# Patient Record
Sex: MALE | Race: WHITE | NOT HISPANIC OR LATINO | Employment: FULL TIME | URBAN - METROPOLITAN AREA
[De-identification: names, ages, dates, MRNs, and addresses within clinical notes are randomized per-mention and may not be internally consistent; named-entity substitution may affect disease eponyms.]

---

## 2023-09-29 ENCOUNTER — HOSPITAL ENCOUNTER (OUTPATIENT)
Dept: DATA CONVERSION | Facility: HOSPITAL | Age: 53
Discharge: HOME | End: 2023-09-29
Payer: COMMERCIAL

## 2023-09-29 DIAGNOSIS — N43.41 SPERMATOCELE OF EPIDIDYMIS, SINGLE: ICD-10-CM

## 2023-10-13 ENCOUNTER — APPOINTMENT (OUTPATIENT)
Dept: RADIOLOGY | Facility: HOSPITAL | Age: 53
End: 2023-10-13
Payer: COMMERCIAL

## 2023-10-13 ENCOUNTER — APPOINTMENT (OUTPATIENT)
Dept: RADIOLOGY | Facility: CLINIC | Age: 53
End: 2023-10-13
Payer: COMMERCIAL

## 2023-10-13 LAB
ANION GAP SERPL CALC-SCNC: 10 MMOL/L
APPEARANCE UR: CLEAR
BASOPHILS # BLD AUTO: 0.09 X10*3/UL (ref 0–0.1)
BASOPHILS NFR BLD AUTO: 0.8 %
BILIRUB UR STRIP.AUTO-MCNC: NEGATIVE MG/DL
BUN SERPL-MCNC: 17 MG/DL (ref 8–25)
CALCIUM SERPL-MCNC: 9 MG/DL (ref 8.5–10.4)
CHLORIDE SERPL-SCNC: 106 MMOL/L (ref 97–107)
CO2 SERPL-SCNC: 24 MMOL/L (ref 24–31)
COLOR UR: COLORLESS
CREAT SERPL-MCNC: 1 MG/DL (ref 0.4–1.6)
EOSINOPHIL # BLD AUTO: 0.14 X10*3/UL (ref 0–0.7)
EOSINOPHIL NFR BLD AUTO: 1.2 %
ERYTHROCYTE [DISTWIDTH] IN BLOOD BY AUTOMATED COUNT: 13 % (ref 11.5–14.5)
GFR SERPL CREATININE-BSD FRML MDRD: >90 ML/MIN/1.73M*2
GLUCOSE SERPL-MCNC: 112 MG/DL (ref 65–99)
GLUCOSE UR STRIP.AUTO-MCNC: NORMAL MG/DL
HCT VFR BLD AUTO: 39.5 % (ref 41–52)
HGB BLD-MCNC: 13.6 G/DL (ref 13.5–17.5)
IMM GRANULOCYTES # BLD AUTO: 0.03 X10*3/UL (ref 0–0.7)
IMM GRANULOCYTES NFR BLD AUTO: 0.3 % (ref 0–0.9)
KETONES UR STRIP.AUTO-MCNC: NEGATIVE MG/DL
LEUKOCYTE ESTERASE UR QL STRIP.AUTO: NEGATIVE
LYMPHOCYTES # BLD AUTO: 3.69 X10*3/UL (ref 1.2–4.8)
LYMPHOCYTES NFR BLD AUTO: 32.7 %
MCH RBC QN AUTO: 31.3 PG (ref 26–34)
MCHC RBC AUTO-ENTMCNC: 34.4 G/DL (ref 32–36)
MCV RBC AUTO: 91 FL (ref 80–100)
MONOCYTES # BLD AUTO: 0.9 X10*3/UL (ref 0.1–1)
MONOCYTES NFR BLD AUTO: 8 %
NEUTROPHILS # BLD AUTO: 6.43 X10*3/UL (ref 1.2–7.7)
NEUTROPHILS NFR BLD AUTO: 57 %
NITRITE UR QL STRIP.AUTO: NEGATIVE
NRBC BLD-RTO: 0 /100 WBCS (ref 0–0)
PH UR STRIP.AUTO: 6 [PH]
PLATELET # BLD AUTO: 282 X10*3/UL (ref 150–450)
PMV BLD AUTO: 10.5 FL (ref 7.5–11.5)
POTASSIUM SERPL-SCNC: 3.5 MMOL/L (ref 3.4–5.1)
PROT UR STRIP.AUTO-MCNC: NEGATIVE MG/DL
RBC # BLD AUTO: 4.35 X10*6/UL (ref 4.5–5.9)
RBC # UR STRIP.AUTO: NEGATIVE /UL
SODIUM SERPL-SCNC: 140 MMOL/L (ref 133–145)
SP GR UR STRIP.AUTO: 1
UROBILINOGEN UR STRIP.AUTO-MCNC: NORMAL MG/DL
WBC # BLD AUTO: 11.3 X10*3/UL (ref 4.4–11.3)

## 2023-10-13 PROCEDURE — 36415 COLL VENOUS BLD VENIPUNCTURE: CPT | Performed by: EMERGENCY MEDICINE

## 2023-10-13 PROCEDURE — 76870 US EXAM SCROTUM: CPT

## 2023-10-13 PROCEDURE — 81003 URINALYSIS AUTO W/O SCOPE: CPT | Performed by: EMERGENCY MEDICINE

## 2023-10-13 PROCEDURE — 85025 COMPLETE CBC W/AUTO DIFF WBC: CPT | Performed by: EMERGENCY MEDICINE

## 2023-10-13 PROCEDURE — 99284 EMERGENCY DEPT VISIT MOD MDM: CPT | Mod: 25

## 2023-10-13 PROCEDURE — 80048 BASIC METABOLIC PNL TOTAL CA: CPT | Performed by: EMERGENCY MEDICINE

## 2023-10-13 ASSESSMENT — PAIN DESCRIPTION - ORIENTATION: ORIENTATION: RIGHT

## 2023-10-13 ASSESSMENT — PAIN DESCRIPTION - LOCATION: LOCATION: SCROTUM

## 2023-10-13 ASSESSMENT — COLUMBIA-SUICIDE SEVERITY RATING SCALE - C-SSRS
6. HAVE YOU EVER DONE ANYTHING, STARTED TO DO ANYTHING, OR PREPARED TO DO ANYTHING TO END YOUR LIFE?: NO
2. HAVE YOU ACTUALLY HAD ANY THOUGHTS OF KILLING YOURSELF?: NO
1. IN THE PAST MONTH, HAVE YOU WISHED YOU WERE DEAD OR WISHED YOU COULD GO TO SLEEP AND NOT WAKE UP?: NO

## 2023-10-13 ASSESSMENT — PAIN - FUNCTIONAL ASSESSMENT: PAIN_FUNCTIONAL_ASSESSMENT: 0-10

## 2023-10-13 ASSESSMENT — PAIN SCALES - GENERAL: PAINLEVEL_OUTOF10: 2

## 2023-10-14 ENCOUNTER — HOSPITAL ENCOUNTER (EMERGENCY)
Facility: HOSPITAL | Age: 53
Discharge: HOME | End: 2023-10-14
Attending: EMERGENCY MEDICINE
Payer: COMMERCIAL

## 2023-10-14 VITALS
BODY MASS INDEX: 30.1 KG/M2 | DIASTOLIC BLOOD PRESSURE: 88 MMHG | RESPIRATION RATE: 14 BRPM | WEIGHT: 215 LBS | TEMPERATURE: 97.6 F | OXYGEN SATURATION: 99 % | SYSTOLIC BLOOD PRESSURE: 159 MMHG | HEIGHT: 71 IN | HEART RATE: 81 BPM

## 2023-10-14 DIAGNOSIS — N50.82 SCROTAL PAIN: Primary | ICD-10-CM

## 2023-10-14 DIAGNOSIS — T81.89XA DRAINING POSTOPERATIVE WOUND, INITIAL ENCOUNTER: ICD-10-CM

## 2023-10-14 ASSESSMENT — LIFESTYLE VARIABLES
REASON UNABLE TO ASSESS: NO
EVER FELT BAD OR GUILTY ABOUT YOUR DRINKING: NO
EVER HAD A DRINK FIRST THING IN THE MORNING TO STEADY YOUR NERVES TO GET RID OF A HANGOVER: NO
HAVE PEOPLE ANNOYED YOU BY CRITICIZING YOUR DRINKING: NO
HAVE YOU EVER FELT YOU SHOULD CUT DOWN ON YOUR DRINKING: NO

## 2023-10-14 NOTE — ED PROVIDER NOTES
"TIME: 2:46 AM   PCP: No Assigned PCP Generic Provider, MD    HISTORY  CHIEF COMPLAINT entered by TRIAGE:  Triage note reviewed and states: Groin Swelling (Patient had surgery on his right testicle 2 weeks ago after a crowbar incident at work.  Today he was driving and felt his pants and legs wet suddenly and noticed a lot of clear bloody drainage; states \"a lot of it\".  Patient called his doctor and they called in antibiotic.  Patient was concerned because he has had the chills today.  Felt better after it drained but now its sore.)    HISTORY:  Historian is: patient. History/Exam Limitations: none.  Jordan Singh is a 53 y.o. male who comes from home with a complaint of groin pain    53 YOM who presents to the ED with swelling and draining R testicular wound. Patient has a history of multiple testicular surgeries related to prior trauma to R testicle. States that two weeks ago, a urologist completed a surgery on his R testicular \"cord.\" Since then, he was healing well until about a week ago. Developed a large fluid filled mass over the R testicle. He says it was the size of a softball. His urologist called in KEFLEX and he started taking it today. This evening, the patient was sitting in his car when his large fluid filled mass \"popped.\" He says a large amount of blood tinged fluid drained from his testicular wound all over his car. He says since then, he continues to have scant clear drainage and \"pus\" drain from the testicle. He has minimal pain. No fevers. No chills. No urinary complaints. No new trauma. No other complaints.     Nursing notes reviewed. Outside records reviewed: Unable to see uro notes.    ROS:  Constitutional - No fever, no chills  HEENT - No visual changes, no eye pain, no ear pain, no sore throat  Head: Atraumatic, normocephalic  Respiratory - No cough, no shortness of breath  Cardiovascular - No dyspnea on exertion, no chest pain, no LE edema, no palpitations   Gastrointestinal - No nausea or " "vomiting, no abd pain, no diarrhea, no black or bloody stools  Genitourinary - No dysuria, urgency, or frequency, no hematuria; + lesions + discharge  Skin - No rash, no bruising  Musculoskeletal - No joint pain or swelling  Neurological - No weakness, no numbness, no HA, no ataxia    PAST MEDICAL HISTORY:  There is no problem list on file for this patient.   No past medical history on file.  PAST SURGICAL HISTORY:  No past surgical history on file.  MEDICATIONS:  No current facility-administered medications on file prior to encounter.     No current outpatient medications on file prior to encounter.     Medications ordered at this visit have been reconciled with the above list of medications.    ALLERGIES:  Allergies   Allergen Reactions    Codeine Itching     SOCIAL HISTORY:  Social History     Tobacco Use   Smoking Status Not on file   Smokeless Tobacco Not on file      Social History     Substance and Sexual Activity   Alcohol Use Not on file      Social History     Substance and Sexual Activity   Drug Use Not on file     FAMILY HISTORY:  No family history on file.    PHYSICAL EXAM  Triage vitals: Visit Vitals  BP (!) 169/99 (BP Location: Left arm, Patient Position: Sitting)   Pulse 64   Temp 36.6 °C (97.9 °F) (Oral)   Resp 16   Ht 1.803 m (5' 11\")   Wt 97.5 kg (215 lb)   SpO2 98%   BMI 29.99 kg/m²   BSA 2.21 m²     Vital signs, oxygen saturation have been reviewed and interpreted as: HTN    General - Alert, no acute distress, nontoxic, oriented x 4  Head - Normocephalic and atraumatic, no gross abnormalities  Eyes - Eyes normal  Ears - Ear external normal  Nose - No congestion or discharge  Throat - Clear, mmm  Neck - Supple  Lungs - CTAB, normal resp effort  Heart - RRR, no murmur  Abdomen - Soft, no tenderness, no rebound, no rigidity  : R lateral scrotum with erythematous indurated skin, approx 2x2cm in size, no obvious drainage, no fluctuance. Minimally tender.   Extremity - No focal tenderness, normal " ROM, no trauma  Back - Normal, no CVAT  Skin - Warm, dry, no rash  Neuro - CN 2-12 intact, moves all extremities spontaneously bilaterally   Psych - normal mood and affect, normal judgment    ED COURSE/MDM    MDM:  53 YOM who presents with draining testicular mass 2 weeks post op. The area itself does not appear to be an abscess, could have an overlying cellulitis but based on the sound of history, likely had a seroma that has since drained. Will get US to ensure there is no complex cyst in testicle concerning for abscess. Not septic appearing. No urinary complaints concerning for uti. Will monitor in the ED.     ED COURSE:    2:46 AM  Discussed with Dr. Marks. US here shows some r scrotal soft tissue swelling with trace fluid. Shared these results, color of fluid and lab work with . We agree that the patient likely has a seroma and the fluid should improve over the weekend. Recommended keeping the area clean and dry with gauze, continuing meds and calling Dr. Abbott on Monday. Recommended returning with worsening pain, redness or foul smelling drainage.     ED PROVIDER INTERPRETATION OF DATA (RAD, EKG):    ABNORMAL LABS SUMMARY:  Labs Reviewed   CBC WITH AUTO DIFFERENTIAL - Abnormal       Result Value    WBC 11.3      nRBC 0.0      RBC 4.35 (*)     Hemoglobin 13.6      Hematocrit 39.5 (*)     MCV 91      MCH 31.3      MCHC 34.4      RDW 13.0      Platelets 282      MPV 10.5      Neutrophils % 57.0      Immature Granulocytes %, Automated 0.3      Lymphocytes % 32.7      Monocytes % 8.0      Eosinophils % 1.2      Basophils % 0.8      Neutrophils Absolute 6.43      Immature Granulocytes Absolute, Automated 0.03      Lymphocytes Absolute 3.69      Monocytes Absolute 0.90      Eosinophils Absolute 0.14      Basophils Absolute 0.09     BASIC METABOLIC PANEL - Abnormal    Glucose 112 (*)     Sodium 140      Potassium 3.5      Chloride 106      Bicarbonate 24      Urea Nitrogen 17      Creatinine 1.00       eGFR >90      Calcium 9.0      Anion Gap 10     URINALYSIS WITH REFLEX MICROSCOPIC - Abnormal    Color, Urine Colorless (*)     Appearance, Urine Clear      Specific Gravity, Urine 1.005      pH, Urine 6.0      Protein, Urine NEGATIVE      Glucose, Urine Normal      Blood, Urine NEGATIVE      Ketones, Urine NEGATIVE      Bilirubin, Urine NEGATIVE      Urobilinogen, Urine Normal      Nitrite, Urine NEGATIVE      Leukocyte Esterase, Urine NEGATIVE       The above laboratory studies were interpreted by me and reveal UA neg, no anemia, wbc 11.     PROCEDURES/BEDSIDE ULTRASOUND:    DIAGNOSIS AND DISPOSITION  DIAGNOSIS/IMPRESSION:  1. Scrotal pain        2. Draining postoperative wound, initial encounter            DISPOSITION:  Disposition: Discharge home  Condition: Good    DISCHARGE PRESCRIPTIONS/INSTRUCTIONS:    Discharge instructions given to patient. I discussed the diagnosis, treatment plan and follow-up plan with the patient and/or family. Reasons to return to the Emergency Department were reviewed in detail. All questions were answered. The patient and/or family expressed understanding of instructions and return precautions.    Referral to PCP/specialist for further evaluation, if specified:  Dequan Marks MD  [id number] [id number]  [phone]      Please keep your wound clean and dry. Use gauze to keep the area covered. If you have worsening redness, pain or fevers over the area, please return to the ED.    DIAGNOSTIC REPORTS:  Laboratory/radiology tests have been ordered with results reviewed and considered in the medical decision making process.    Provider Attestation (if applicable) and Signature:  Jesus Bains MD  Emergency Department    Notes: Portions of this report may have been transcribed using voice recognition software. Every effort was made to ensure accuracy; however, inadvertent computerized transcription errors may be present.       Jesus Bains MD  10/14/23  [id number]

## 2024-03-05 ENCOUNTER — APPOINTMENT (OUTPATIENT)
Dept: RADIOLOGY | Facility: HOSPITAL | Age: 54
End: 2024-03-05
Payer: COMMERCIAL

## 2024-03-05 ENCOUNTER — HOSPITAL ENCOUNTER (EMERGENCY)
Facility: HOSPITAL | Age: 54
Discharge: HOME | End: 2024-03-05
Attending: STUDENT IN AN ORGANIZED HEALTH CARE EDUCATION/TRAINING PROGRAM
Payer: COMMERCIAL

## 2024-03-05 VITALS
HEART RATE: 62 BPM | RESPIRATION RATE: 18 BRPM | SYSTOLIC BLOOD PRESSURE: 169 MMHG | HEIGHT: 71 IN | OXYGEN SATURATION: 100 % | TEMPERATURE: 96.8 F | DIASTOLIC BLOOD PRESSURE: 98 MMHG | BODY MASS INDEX: 29.4 KG/M2 | WEIGHT: 210 LBS

## 2024-03-05 DIAGNOSIS — G56.02 CARPAL TUNNEL SYNDROME OF LEFT WRIST: Primary | ICD-10-CM

## 2024-03-05 DIAGNOSIS — M25.532 ACUTE PAIN OF LEFT WRIST: ICD-10-CM

## 2024-03-05 PROCEDURE — 73110 X-RAY EXAM OF WRIST: CPT | Mod: LT

## 2024-03-05 PROCEDURE — 99283 EMERGENCY DEPT VISIT LOW MDM: CPT

## 2024-03-05 PROCEDURE — 2500000004 HC RX 250 GENERAL PHARMACY W/ HCPCS (ALT 636 FOR OP/ED): Performed by: STUDENT IN AN ORGANIZED HEALTH CARE EDUCATION/TRAINING PROGRAM

## 2024-03-05 PROCEDURE — 73110 X-RAY EXAM OF WRIST: CPT | Mod: LEFT SIDE | Performed by: RADIOLOGY

## 2024-03-05 RX ORDER — PREDNISONE 20 MG/1
20 TABLET ORAL DAILY
Qty: 14 TABLET | Refills: 0 | Status: SHIPPED | OUTPATIENT
Start: 2024-03-05 | End: 2024-03-05 | Stop reason: SDUPTHER

## 2024-03-05 RX ORDER — PREDNISONE 20 MG/1
20 TABLET ORAL DAILY
Qty: 14 TABLET | Refills: 0 | Status: SHIPPED | OUTPATIENT
Start: 2024-03-05 | End: 2024-03-19

## 2024-03-05 RX ORDER — DEXAMETHASONE 6 MG/1
6 TABLET ORAL ONCE
Status: COMPLETED | OUTPATIENT
Start: 2024-03-05 | End: 2024-03-05

## 2024-03-05 RX ADMIN — DEXAMETHASONE 6 MG: 6 TABLET ORAL at 07:37

## 2024-03-05 ASSESSMENT — PAIN SCALES - GENERAL: PAINLEVEL_OUTOF10: 3

## 2024-03-05 ASSESSMENT — LIFESTYLE VARIABLES
HAVE PEOPLE ANNOYED YOU BY CRITICIZING YOUR DRINKING: NO
HAVE YOU EVER FELT YOU SHOULD CUT DOWN ON YOUR DRINKING: NO
EVER HAD A DRINK FIRST THING IN THE MORNING TO STEADY YOUR NERVES TO GET RID OF A HANGOVER: NO
EVER FELT BAD OR GUILTY ABOUT YOUR DRINKING: NO

## 2024-03-05 ASSESSMENT — PAIN - FUNCTIONAL ASSESSMENT: PAIN_FUNCTIONAL_ASSESSMENT: 0-10

## 2024-03-05 ASSESSMENT — PAIN DESCRIPTION - PROGRESSION: CLINICAL_PROGRESSION: GRADUALLY IMPROVING

## 2024-03-05 NOTE — ED PROVIDER NOTES
HPI   Chief Complaint   Patient presents with    Hand Pain     Pt states that around 3pm yesterday he felt a pop in his left hand. After that his hands felt numb , pain started shooting up his arm. He used ice and took pain meds but nothing worked       53-year-old male presents with left wrist pain.  Patient states yesterday his left wrist appeared to be stuck in a particular position and it popped, having immediate pain and swelling.  He went to bed and woke up early this morning with severe pain to the left wrist.  Notes the swelling was significantly worse.  Took approximately seven 200 mg tablets of ibuprofen in an effort to control the pain.  Notes increased numbness to his thumb, index, middle finger but sparing the pinky finger.  No prior history of carpal tunnel.  No prior history of wrist injury.  Denies recent trauma or falls.                          No data recorded                   Patient History   No past medical history on file.  Past Surgical History:   Procedure Laterality Date    TESTICLE SURGERY       No family history on file.  Social History     Tobacco Use    Smoking status: Unknown    Smokeless tobacco: Not on file   Substance Use Topics    Alcohol use: Defer    Drug use: Defer       Physical Exam   ED Triage Vitals [03/05/24 0627]   Temperature Heart Rate Respirations BP   36 °C (96.8 °F) 62 18 (!) 169/98      Pulse Ox Temp Source Heart Rate Source Patient Position   100 % Tympanic -- Sitting      BP Location FiO2 (%)     Right arm --       Physical Exam  Vitals and nursing note reviewed.   Constitutional:       General: He is not in acute distress.     Appearance: He is not ill-appearing.   HENT:      Head: Normocephalic and atraumatic.      Mouth/Throat:      Mouth: Mucous membranes are moist.      Pharynx: Oropharynx is clear.   Eyes:      Extraocular Movements: Extraocular movements intact.      Conjunctiva/sclera: Conjunctivae normal.      Pupils: Pupils are equal, round, and reactive  to light.   Cardiovascular:      Rate and Rhythm: Normal rate and regular rhythm.   Pulmonary:      Effort: Pulmonary effort is normal. No respiratory distress.      Breath sounds: Normal breath sounds.   Abdominal:      General: There is no distension.      Palpations: Abdomen is soft.      Tenderness: There is no abdominal tenderness.   Musculoskeletal:         General: No swelling or deformity. Normal range of motion.      Left wrist: Swelling present. No bony tenderness or snuff box tenderness.      Cervical back: Normal range of motion and neck supple.      Comments: Palpable radial and ulnar pulse to left upper extremity.  Swelling over carpal tunnel region.  Motor intact to hand and fingers.  Normal  strength. Reported sensation change to thumb, index, and middle fingers.    Skin:     General: Skin is warm and dry.      Capillary Refill: Capillary refill takes less than 2 seconds.   Neurological:      General: No focal deficit present.      Mental Status: He is alert and oriented to person, place, and time. Mental status is at baseline.   Psychiatric:         Mood and Affect: Mood normal.         Behavior: Behavior normal.         ED Course & MDM   ED Course as of 03/05/24 0857   Tue Mar 05, 2024   0823 XR wrist left 3+ views  FINDINGS:  There is no fracture or subluxation. Well corticated small bone  densities are noted along the volar aspect of the radiocarpal joint.  Minimal degenerative changes  are present, involving the 1st  carpometacarpal joint and articulation between the navicular and  trapezium/trapezoid, with joint space narrowing and small  osteophytes. The alignment is anatomic. The soft tissues are  unremarkable.      IMPRESSION:  Minimal degenerative changes without acute fracture or subluxation.   [JM]      ED Course User Index  [JM] Rody Florez MD         Diagnoses as of 03/05/24 0857   Carpal tunnel syndrome of left wrist   Acute pain of left wrist       Medical Decision  Making  53 y.o. male presents to the ED with left wrist pain and swelling.  Denies trauma. No deformity, no open wounds, or neurovascular compromise on exam. Most likely diagnosis from the mechanism and initial H&P is carpal tunnel syndrome. Considered fracture, muscle strain, tendinitis, contusion.  Xray negative for fracture or dislocation.  Prescribed oral steroids and placed patient in wrist brace.  Encouraged patient to use brace at night.  Provided patient with name of hand and wrist surgeon.  Patient stable for discharge at this time.         Procedure  Procedures     Rody Florez MD  03/05/24 0995

## 2024-03-11 ENCOUNTER — PHARMACY VISIT (OUTPATIENT)
Dept: PHARMACY | Facility: CLINIC | Age: 54
End: 2024-03-11

## 2024-03-11 PROCEDURE — RXMED WILLOW AMBULATORY MEDICATION CHARGE

## 2024-03-11 RX ORDER — DOXAZOSIN 2 MG/1
TABLET ORAL
Qty: 90 TABLET | Refills: 3 | OUTPATIENT
Start: 2024-03-04

## 2024-04-25 ENCOUNTER — HOSPITAL ENCOUNTER (OUTPATIENT)
Dept: RADIOLOGY | Facility: HOSPITAL | Age: 54
Discharge: HOME | End: 2024-04-25
Payer: COMMERCIAL

## 2024-04-25 DIAGNOSIS — N50.811 RIGHT TESTICULAR PAIN: ICD-10-CM

## 2024-04-25 PROCEDURE — 93975 VASCULAR STUDY: CPT

## 2024-04-27 ENCOUNTER — APPOINTMENT (OUTPATIENT)
Dept: RADIOLOGY | Facility: HOSPITAL | Age: 54
End: 2024-04-27
Payer: COMMERCIAL

## 2024-04-28 ENCOUNTER — HOSPITAL ENCOUNTER (EMERGENCY)
Facility: HOSPITAL | Age: 54
Discharge: HOME | End: 2024-04-28
Payer: COMMERCIAL

## 2024-04-28 VITALS
WEIGHT: 210 LBS | DIASTOLIC BLOOD PRESSURE: 77 MMHG | HEIGHT: 71 IN | OXYGEN SATURATION: 97 % | BODY MASS INDEX: 29.4 KG/M2 | SYSTOLIC BLOOD PRESSURE: 144 MMHG | HEART RATE: 84 BPM | RESPIRATION RATE: 18 BRPM | TEMPERATURE: 97.5 F

## 2024-04-28 DIAGNOSIS — K08.89 PAIN, DENTAL: Primary | ICD-10-CM

## 2024-04-28 PROCEDURE — 99284 EMERGENCY DEPT VISIT MOD MDM: CPT | Mod: 25

## 2024-04-28 PROCEDURE — 64400 NJX AA&/STRD TRIGEMINAL NRV: CPT

## 2024-04-28 RX ORDER — ASPIRIN 81 MG/1
324 TABLET ORAL DAILY
COMMUNITY

## 2024-04-28 RX ORDER — AMOXICILLIN AND CLAVULANATE POTASSIUM 875; 125 MG/1; MG/1
1 TABLET, FILM COATED ORAL EVERY 12 HOURS
Qty: 14 TABLET | Refills: 0 | Status: SHIPPED | OUTPATIENT
Start: 2024-04-28 | End: 2024-05-05

## 2024-04-28 RX ORDER — IBUPROFEN 800 MG/1
800 TABLET ORAL EVERY 8 HOURS PRN
COMMUNITY

## 2024-04-28 ASSESSMENT — PAIN - FUNCTIONAL ASSESSMENT: PAIN_FUNCTIONAL_ASSESSMENT: 0-10

## 2024-04-28 ASSESSMENT — COLUMBIA-SUICIDE SEVERITY RATING SCALE - C-SSRS
1. IN THE PAST MONTH, HAVE YOU WISHED YOU WERE DEAD OR WISHED YOU COULD GO TO SLEEP AND NOT WAKE UP?: NO
2. HAVE YOU ACTUALLY HAD ANY THOUGHTS OF KILLING YOURSELF?: NO
6. HAVE YOU EVER DONE ANYTHING, STARTED TO DO ANYTHING, OR PREPARED TO DO ANYTHING TO END YOUR LIFE?: NO

## 2024-04-28 ASSESSMENT — PAIN SCALES - GENERAL: PAINLEVEL_OUTOF10: 5 - MODERATE PAIN

## 2024-04-28 ASSESSMENT — PAIN DESCRIPTION - DESCRIPTORS: DESCRIPTORS: THROBBING

## 2024-04-28 ASSESSMENT — PAIN DESCRIPTION - LOCATION: LOCATION: TEETH

## 2024-04-28 ASSESSMENT — LIFESTYLE VARIABLES
EVER HAD A DRINK FIRST THING IN THE MORNING TO STEADY YOUR NERVES TO GET RID OF A HANGOVER: NO
EVER FELT BAD OR GUILTY ABOUT YOUR DRINKING: NO
HAVE YOU EVER FELT YOU SHOULD CUT DOWN ON YOUR DRINKING: NO
TOTAL SCORE: 0
HAVE PEOPLE ANNOYED YOU BY CRITICIZING YOUR DRINKING: NO

## 2024-04-28 ASSESSMENT — PAIN DESCRIPTION - ORIENTATION: ORIENTATION: RIGHT;UPPER

## 2024-04-28 NOTE — ED PROVIDER NOTES
HPI   Chief Complaint   Patient presents with    Dental Pain     Presents to ED for rt upper tooth pain since this past Friday.       Patient is a 53-year-old male presenting to the emergency department for evaluation of tooth ache.  Patient states a tooth on the top right side of his mouth has been bothering him since Friday.  He denies any trauma or injury to the tooth.  He states he started feeling the pain Friday night however Saturday it became a more sharp throbbing pain.  He states he tried getting into a dentist however is unable to get a hold of anyone.  He also states he feels like the right side of his face is a little swollen.  He denies fevers or chills.  He denies chest pain, shortness of breath, nausea, vomiting, abdominal pain.                           Devin Coma Scale Score: 15                     Patient History   No past medical history on file.  Past Surgical History:   Procedure Laterality Date    TESTICLE SURGERY       No family history on file.  Social History     Tobacco Use    Smoking status: Unknown    Smokeless tobacco: Not on file   Substance Use Topics    Alcohol use: Defer    Drug use: Defer       Physical Exam   ED Triage Vitals [04/28/24 0919]   Temperature Heart Rate Respirations BP   36.4 °C (97.5 °F) 84 18 144/77      Pulse Ox Temp Source Heart Rate Source Patient Position   97 % Tympanic Monitor --      BP Location FiO2 (%)     -- --       Physical Exam  Vitals and nursing note reviewed.   Constitutional:       General: He is not in acute distress.     Appearance: Normal appearance. He is not ill-appearing or toxic-appearing.   HENT:      Head: Normocephalic and atraumatic.      Nose: Nose normal.      Mouth/Throat:      Mouth: Mucous membranes are moist.      Dentition: No gingival swelling, dental abscesses or gum lesions.      Pharynx: Oropharynx is clear. Uvula midline.        Comments: Pain over this general region with a brown spot noted to the tooth concerning for  possible cavity versus staining of the teeth  Eyes:      Pupils: Pupils are equal, round, and reactive to light.   Cardiovascular:      Rate and Rhythm: Normal rate and regular rhythm.      Pulses: Normal pulses.      Heart sounds: Normal heart sounds.   Pulmonary:      Effort: Pulmonary effort is normal.      Breath sounds: Normal breath sounds.   Musculoskeletal:         General: Normal range of motion.      Cervical back: Normal range of motion. No tenderness.   Skin:     General: Skin is warm and dry.      Findings: No erythema.   Neurological:      General: No focal deficit present.      Mental Status: He is alert and oriented to person, place, and time. Mental status is at baseline.   Psychiatric:         Mood and Affect: Mood normal.         Behavior: Behavior normal.         ED Course & MDM   Diagnoses as of 04/28/24 1010   Pain, dental       Medical Decision Making  **Disclaimer parts of this chart have been completed using voice recognition software. Please excuse any errors of transcription.     Evaluated this patient independently and my supervising physician was available for consultation.    HPI: Detailed above.    Exam: A medically appropriate exam performed, outlined above, given the known history and presentation.    History obtained from: Patient    EMERGENCY DEPARTMENT COURSE and DIFFERENTIAL DIAGNOSIS/MDM:  Patient is a 53-year-old male presenting to the emergency department for evaluation of dental pain.  On physical exam vital signs remarkable for systolic hypertension but otherwise stable and patient is in no acute distress.  He has pain noted to the upper right first bicuspid with no tenderness to percussion.  There does appear to be a brown spot on the tooth concerning for possible cavity.  Dental block was performed to give the patient some relief.  Patient also given a prescription for Augmentin.  He was advised to follow-up with dentist outpatient within the next 1 to 2 days.  He will  "return to the emergency department any new or worsening symptoms.    Vitals:    Vitals:    04/28/24 0919 04/28/24 0935   BP: 144/77    Pulse: 84    Resp: 18    Temp: 36.4 °C (97.5 °F)    TempSrc: Tympanic    SpO2: 97% 97%   Weight: 95.3 kg (210 lb)    Height: 1.803 m (5' 11\")      History Limited by:    None    Independent history obtained from:    None      External records reviewed:    None      Diagnostics interpreted by me:    None      Discussions with other clinicians:    None      Chronic conditions impacting care:    None      Social determinants of health affecting care:    None    Diagnostic tests considered but not performed: None    ED Medications managed:    Medications - No data to display      Prescription drugs considered:    Antibiotics Augmentin        Procedure  Dental Block    Performed by: Tammy Hammonds PA-C  Authorized by: Tammy Hammonds PA-C    Consent:     Consent obtained:  Verbal    Consent given by:  Patient    Risks, benefits, and alternatives were discussed: yes      Risks discussed:  Hematoma, allergic reaction, intravascular injection, infection, nerve damage, pain, swelling and unsuccessful block    Alternatives discussed:  No treatment and delayed treatment  Universal protocol:     Procedure explained and questions answered to patient or proxy's satisfaction: yes      Site/side marked: yes      Immediately prior to procedure, a time out was called: yes      Patient identity confirmed:  Arm band and verbally with patient  Indications:     Indications: dental pain    Location:     Block type:  Supraperiosteal    Supraperiosteal location:  Upper teeth    Upper teeth location:  5/RU 1st bicuspid  Procedure details:     Syringe type:  Controlled syringe    Needle gauge:  27 G    Anesthetic injected:  Bupivacaine 0.5% WITH epi    Injection procedure:  Anatomic landmarks identified, anatomic landmarks palpated, negative aspiration for blood, introduced needle and incremental " injection  Post-procedure details:     Outcome:  Anesthesia achieved    Procedure completion:  Tolerated well, no immediate complications       Tammy Hammonds PA-C  04/28/24 1011

## 2024-04-28 NOTE — DISCHARGE INSTRUCTIONS
Thank you for visiting [facility] emergency department.  It was a pleasure caring for you.    Be sure to take all medications, over the counter medications or prescription medications only as directed.     Be sure to follow up as directed in 1-2 days.  All of the details of your follow up instructions are detailed in the follow up section of this packet.      If you are being discharged with any pains medications or muscle relaxers (norco, Vicodin, hydrocodone products, Percocet, oxycodone products, flexeril, cyclobenzaprine, robaxin, norflex, brand or generic, or any other pain controlling medications with the exception of Ibuprofen and regular Tylenol, do not drive or operate machinery, climb ladders or participate in any activity that could potentially put yourself or others at risk should you get dizzy, or be/feel impaired at all.        It is important to remember that your care does not end here and you must continue to monitor your condition closely. Please return to the emergency department for any worsening or concerning signs or symptoms as directed by our conversations and the discharge instructions. Otherwise please follow up with your doctor in 2 days if no better or worse. If you do not have a doctor please contact the referral number on your discharge instructions. Please contact any physician specialists provided in your discharge notes as it is very important to follow up with them regarding your condition. If you are unable to reach the physicians provided, please come back to the Emergency Department at any time.       As always, please take medications as directed. If you have any questions at all regarding your medications, please contact the pharmacist, the emergency department, or your doctor. Before taking any medication prescribed in the Emergency Department, please review the medication side effects and drug interactions (<http://www.rxlist.com/script/main/hp.asp>) as they may interact with  your home medications.     Having trouble affording medications? Try [email or web address] <[email or web address]! (This is not a hospital endorsed website, merely a recommendation based on my own personal experiences with [employer])      Return to emergency room without delay for ANY new or worsening pains or for any other symptoms or concerns.      Return with worsening pains, nausea, vomiting, trouble breathing, palpitations, shortness of breath, inability to pass stool or urine, loss of control of stool or urine, any numbness or tingling (that is not normal for you), uncontrolled fevers, the passing of blood or other material in stool or urine, rashes, pains or for any other symptoms or concerns you may have.  You are always welcome to return to the ER at any time for any reason or for any other concerns you may have.

## 2024-05-20 DIAGNOSIS — R10.9 UNSPECIFIED ABDOMINAL PAIN: Primary | ICD-10-CM

## 2024-05-22 ENCOUNTER — LAB (OUTPATIENT)
Dept: LAB | Facility: LAB | Age: 54
End: 2024-05-22
Payer: COMMERCIAL

## 2024-05-22 DIAGNOSIS — R10.9 UNSPECIFIED ABDOMINAL PAIN: ICD-10-CM

## 2024-05-22 LAB
ANION GAP SERPL CALC-SCNC: 9 MMOL/L
BUN SERPL-MCNC: 22 MG/DL (ref 8–25)
CALCIUM SERPL-MCNC: 9.5 MG/DL (ref 8.5–10.4)
CHLORIDE SERPL-SCNC: 110 MMOL/L (ref 97–107)
CO2 SERPL-SCNC: 24 MMOL/L (ref 24–31)
CREAT SERPL-MCNC: 1.2 MG/DL (ref 0.4–1.6)
EGFRCR SERPLBLD CKD-EPI 2021: 73 ML/MIN/1.73M*2
GLUCOSE SERPL-MCNC: 84 MG/DL (ref 65–99)
POTASSIUM SERPL-SCNC: 5.1 MMOL/L (ref 3.4–5.1)
SODIUM SERPL-SCNC: 143 MMOL/L (ref 133–145)

## 2024-05-22 PROCEDURE — 36415 COLL VENOUS BLD VENIPUNCTURE: CPT

## 2024-05-22 PROCEDURE — 80048 BASIC METABOLIC PNL TOTAL CA: CPT

## 2024-05-23 ENCOUNTER — HOSPITAL ENCOUNTER (OUTPATIENT)
Dept: RADIOLOGY | Facility: HOSPITAL | Age: 54
Discharge: HOME | End: 2024-05-23
Payer: COMMERCIAL

## 2024-05-23 DIAGNOSIS — R10.9 UNSPECIFIED ABDOMINAL PAIN: ICD-10-CM

## 2024-05-23 PROCEDURE — 74177 CT ABD & PELVIS W/CONTRAST: CPT

## 2024-05-23 PROCEDURE — 74177 CT ABD & PELVIS W/CONTRAST: CPT | Performed by: RADIOLOGY

## 2024-05-23 PROCEDURE — 2550000001 HC RX 255 CONTRASTS: Performed by: UROLOGY

## 2024-05-23 RX ADMIN — IOHEXOL 75 ML: 350 INJECTION, SOLUTION INTRAVENOUS at 16:45

## 2024-06-18 DIAGNOSIS — N50.811 PAIN IN RIGHT TESTICLE: Primary | ICD-10-CM

## 2024-07-15 ENCOUNTER — LAB REQUISITION (OUTPATIENT)
Dept: LAB | Facility: HOSPITAL | Age: 54
End: 2024-07-15
Payer: COMMERCIAL

## 2024-07-15 DIAGNOSIS — N45.2 ORCHITIS: ICD-10-CM

## 2024-07-15 PROCEDURE — 88305 TISSUE EXAM BY PATHOLOGIST: CPT

## 2024-07-15 PROCEDURE — 88305 TISSUE EXAM BY PATHOLOGIST: CPT | Performed by: STUDENT IN AN ORGANIZED HEALTH CARE EDUCATION/TRAINING PROGRAM

## 2024-08-01 LAB
LABORATORY COMMENT REPORT: NORMAL
PATH REPORT.FINAL DX SPEC: NORMAL
PATH REPORT.GROSS SPEC: NORMAL
PATH REPORT.RELEVANT HX SPEC: NORMAL
PATH REPORT.TOTAL CANCER: NORMAL

## 2024-10-10 ENCOUNTER — APPOINTMENT (OUTPATIENT)
Dept: SURGERY | Facility: CLINIC | Age: 54
End: 2024-10-10
Payer: COMMERCIAL

## 2024-10-10 VITALS — HEIGHT: 71 IN | BODY MASS INDEX: 28.28 KG/M2 | TEMPERATURE: 97.8 F | WEIGHT: 202 LBS

## 2024-10-10 DIAGNOSIS — K62.5 RECTAL BLEEDING: Primary | ICD-10-CM

## 2024-10-10 PROCEDURE — 3008F BODY MASS INDEX DOCD: CPT | Performed by: SURGERY

## 2024-10-10 PROCEDURE — 99203 OFFICE O/P NEW LOW 30 MIN: CPT | Performed by: SURGERY

## 2024-10-10 ASSESSMENT — ENCOUNTER SYMPTOMS
FEVER: 0
SHORTNESS OF BREATH: 0
ACTIVITY CHANGE: 0
CONFUSION: 0
APPETITE CHANGE: 0
CHILLS: 0
DIZZINESS: 0
DIAPHORESIS: 0
AGITATION: 0

## 2024-10-10 NOTE — PROGRESS NOTES
Subjective   Patient ID: Jordan Singh is a 54 y.o. male who presents for New Patient Visit.    The patient is a 54-year-old man who presents for evaluation of protruding rectal mass and bright red blood per rectum.  He has noticed a protruding mass that easily reduces for several years now.  He also occasionally notices blood when he goes to the bathroom, or when he wipes.  He denies any pain.  He also denies any constipation or diarrhea.  He denies any weight loss.  He recently had an orchiectomy for recurrent orchitis.  He does not follow with a primary care doctor, but had preoperative testing prior to his orchiectomy.  He is unsure of his family history due to his father dying at a young age, and not knowing much about his mother's family.    Review of Systems   Constitutional:  Negative for activity change, appetite change, chills, diaphoresis and fever.   Respiratory:  Negative for shortness of breath.    Cardiovascular:  Negative for chest pain.   Neurological:  Negative for dizziness.   Psychiatric/Behavioral:  Negative for agitation and confusion.    All other systems reviewed and are negative.      Objective   Physical Exam  Cardiovascular:      Rate and Rhythm: Normal rate.      Pulses: Normal pulses.   Pulmonary:      Effort: Pulmonary effort is normal.   Genitourinary:     Comments: Right anterior inflammatory tissue, possible hemorrhoid  Neurological:      Mental Status: He is alert.         Assessment/Plan   The patient is a 54-year-old man who presents for evaluation of a protruding rectal lesion and bright red blood per rectum.  On exam he has an enlarged right anterior hemorrhoid column with some mild blood.  I discussed doing a follow-up colonoscopy with the patient to further evaluate given the bright red blood per rectum and possible mass.  The patient would like to schedule a colonoscopy, but has some business to attend to prior to scheduling.  He will be in contact with us when he is ready to  schedule.         Jordon Aponte MD 10/10/24 2:59 PM

## 2024-10-24 ENCOUNTER — OFFICE VISIT (OUTPATIENT)
Dept: ORTHOPEDIC SURGERY | Facility: CLINIC | Age: 54
End: 2024-10-24
Payer: COMMERCIAL

## 2024-10-24 DIAGNOSIS — R22.32 MASS OF LEFT HAND: ICD-10-CM

## 2024-10-24 PROCEDURE — 99204 OFFICE O/P NEW MOD 45 MIN: CPT | Performed by: ORTHOPAEDIC SURGERY

## 2024-10-24 PROCEDURE — 99214 OFFICE O/P EST MOD 30 MIN: CPT | Performed by: ORTHOPAEDIC SURGERY

## 2024-10-24 ASSESSMENT — ENCOUNTER SYMPTOMS
LOSS OF SENSATION IN FEET: 0
DEPRESSION: 0
OCCASIONAL FEELINGS OF UNSTEADINESS: 0

## 2024-10-24 ASSESSMENT — PATIENT HEALTH QUESTIONNAIRE - PHQ9
2. FEELING DOWN, DEPRESSED OR HOPELESS: NOT AT ALL
SUM OF ALL RESPONSES TO PHQ9 QUESTIONS 1 AND 2: 0
1. LITTLE INTEREST OR PLEASURE IN DOING THINGS: NOT AT ALL

## 2024-10-24 ASSESSMENT — PAIN - FUNCTIONAL ASSESSMENT: PAIN_FUNCTIONAL_ASSESSMENT: NO/DENIES PAIN

## 2024-10-25 NOTE — PROGRESS NOTES
History of Present Illness:  Chief Complaint   Patient presents with    Left Hand - Numbness, Pain    Right Hand - Numbness, Pain       The patient presents today for evaluation of bilateral hand pain.  The patient endorses numbness and tingling (predominantly radial three digits).  There is no current neck pain or cervical spine issues.  The patient has tried to the following interventions thus far:  Bracing without relief .  The patient notes the pain is worse with elevated hand activities and at night.  The patient denies any recent trauma.  The pain is sharp, electrical in nature.  Symptoms began about 3 to 4 months ago and progressive in nature.    A few weeks ago he also noticed a mass in the thenar region on his left hand.  No specific pain or tenderness in this area.  He did notice some degree of swelling that seem to arise at the same time as when he noticed the mass.      History reviewed. No pertinent past medical history.    Medication Documentation Review Audit       Reviewed by Ladonna Liao CMA (Medical Assistant) on 10/24/24 at 1447      Medication Order Taking? Sig Documenting Provider Last Dose Status   aspirin 81 mg EC tablet [id number] No Take 4 tablets (324 mg) by mouth once daily. Used for pain relief Historical Provider, MD Not Taking Active   doxazosin (Cardura) 2 mg tablet [id number] No Take 1 tablet by mouth once daily in the evening   Patient not taking: Reported on 10/10/2024    Kaylin Toro MD Not Taking Active   ibuprofen 800 mg tablet [id number] No Take 1 tablet (800 mg) by mouth every 8 hours if needed for mild pain (1 - 3). Historical Provider, MD Not Taking Active                    Allergies   Allergen Reactions    Cinnamon Hives     cinnamon    Codeine Itching and Unknown       Social History     Socioeconomic History    Marital status:      Spouse name: Not on file    Number of children: Not on file    Years of education: Not on file    Highest education level: Not on  file   Occupational History    Not on file   Tobacco Use    Smoking status: Unknown    Smokeless tobacco: Not on file   Substance and Sexual Activity    Alcohol use: Defer    Drug use: Defer    Sexual activity: Defer   Other Topics Concern    Not on file   Social History Narrative    Not on file     Social Drivers of Health     Financial Resource Strain: Not on file   Food Insecurity: Not on file   Transportation Needs: Not on file   Physical Activity: Not on file   Stress: Not on file   Social Connections: Not on file   Intimate Partner Violence: Not on file   Housing Stability: Not on file       Past Surgical History:   Procedure Laterality Date    TESTICLE SURGERY            Review of Systems   GENERAL: Negative for malaise, significant weight loss, fever  MUSCULOSKELETAL: see HPI  NEURO:  see HPI     Physical Examination:  Constitutional: Appears well-developed and well-nourished.  Head: Normocephalic and atraumatic.  Eyes: EOMI grossly  Cardiovascular: Intact distal pulses.   Respiratory: Effort normal. No respiratory distress.  Neurologic: Alert and oriented to person, place, and time.  Skin: Skin is warm and dry.  Hematologic / Lymphatic: No lymphedema, lymphangitis.  Psychiatric: normal mood and affect. Behavior is normal.   Musculoskeletal:  bilateral wrist/hand:  No appreciable thenar atrophy  No atrophy noted of hypothenar eminence   Equivocal Tinel's at carpal tunnel  + Median nerve compression test  + Monica's test  Decreased sensation to light touch in median nerve distribution  5/5 thumb Abduction, 5/5 Finger Abduction  Radial pulse palpable  Good capillary refill  Palpable Dupuytren's tissue in the palm without associated contractures.    Tenderness about right thumb CMC joint with positive CMC grind.    Left hand: Palpable mass near base of thenar eminence measuring approximately 1 x 1 cm.  Slightly firm.  Overlying skin is mobile.        Assessment:  Patient with bilateral carpal tunnel syndrome  with worsening symptoms despite conservative treatment with overnight bracing.  Also with Dupuytren's without contractures and mildly symptomatic right thumb CMC arthritis     Plan:  I reviewed diagnoses with the patient.  Plan on continued observation for Dupuytren's with the understanding that if he undergoes surgery in the future that this could potentially accelerate Dupuytren's contractures.  For the right thumb CMC arthritis plan and continued observation at this time.  Carpal tunnel release surgery is indicated given his failure for conservative treatment, but prior to proceeding with this would recommend obtaining MRI for further evaluation of the mass about the left thenar eminence.  Plan on making the performing excisional biopsy concurrently, but would like additional information for preoperative planning.      He will call the office after MRI is complete for further review and surgical scheduling.    Risks and benefits of continued nonoperative versus operative treatment options were reviewed.  The surgical benefits and the potential complications were reviewed with the patient today, as well as the day surgical setting and the likely postoperative course.  Patient understands that the goal of surgery is prevention of worsening symptoms and potential relief of some symptoms.  Risks discussed included, but were not limited to, residual/recurrent/worsening symptoms, pain, infection, bleeding, stiffness, injury to nerve/blood vessels/tendons, wound healing issues and possible need for reoperation.  I answered the patient's questions.  Plan to schedule surgery after MRI is completed so that a more complete surgical plan regarding the mass can be discussed.

## 2024-11-07 ENCOUNTER — HOSPITAL ENCOUNTER (OUTPATIENT)
Dept: RADIOLOGY | Facility: CLINIC | Age: 54
Discharge: HOME | End: 2024-11-07
Payer: COMMERCIAL

## 2024-11-07 DIAGNOSIS — R22.32 MASS OF LEFT HAND: ICD-10-CM

## 2024-11-07 PROCEDURE — A9575 INJ GADOTERATE MEGLUMI 0.1ML: HCPCS | Performed by: ORTHOPAEDIC SURGERY

## 2024-11-07 PROCEDURE — 2550000001 HC RX 255 CONTRASTS: Performed by: ORTHOPAEDIC SURGERY

## 2024-11-07 PROCEDURE — 73220 MRI UPPR EXTREMITY W/O&W/DYE: CPT | Mod: LT

## 2024-11-07 RX ORDER — GADOTERATE MEGLUMINE 376.9 MG/ML
19 INJECTION INTRAVENOUS
Status: COMPLETED | OUTPATIENT
Start: 2024-11-07 | End: 2024-11-07

## 2024-11-11 ENCOUNTER — TELEPHONE (OUTPATIENT)
Dept: ORTHOPEDIC SURGERY | Facility: CLINIC | Age: 54
End: 2024-11-11
Payer: COMMERCIAL

## 2024-11-11 NOTE — TELEPHONE ENCOUNTER
11/7/24 LT HAND MRI  Patient called and stated Dr. Medina was going to call him to go over the results of his MRI. I told patient we could schedule a telephone visit, he stated no he said he would just call me. Unsure what was discussed as I do not see anything stating he doesn't need a telephone or in person visit.

## 2024-11-12 ENCOUNTER — PREP FOR PROCEDURE (OUTPATIENT)
Dept: ORTHOPEDIC SURGERY | Facility: CLINIC | Age: 54
End: 2024-11-12
Payer: COMMERCIAL

## 2024-11-12 DIAGNOSIS — G56.02 CARPAL TUNNEL SYNDROME OF LEFT WRIST: Primary | ICD-10-CM

## 2024-11-12 NOTE — H&P
Reviewed MRI results with patient.  No discrete mass appreciated.  Patient would like to proceed with left carpal tunnel release.  He does still feel some fullness about the area of concern and would like to proceed with excisional biopsy with the understanding that there may not be a discrete lesion to remove.  This could also be Dupuytren's disease which would return/possibly worsen.

## 2024-11-14 ENCOUNTER — TRANSCRIBE ORDERS (OUTPATIENT)
Dept: SURGERY | Facility: CLINIC | Age: 54
End: 2024-11-14
Payer: COMMERCIAL

## 2024-11-14 DIAGNOSIS — Z12.11 SCREEN FOR COLON CANCER: ICD-10-CM

## 2024-11-14 PROBLEM — K64.4 BLEEDING EXTERNAL HEMORRHOIDS: Status: ACTIVE | Noted: 2024-11-14

## 2024-11-27 ENCOUNTER — ANESTHESIA EVENT (OUTPATIENT)
Dept: OPERATING ROOM | Facility: HOSPITAL | Age: 54
End: 2024-11-27
Payer: COMMERCIAL

## 2024-11-27 ENCOUNTER — PRE-ADMISSION TESTING (OUTPATIENT)
Dept: PREADMISSION TESTING | Facility: HOSPITAL | Age: 54
End: 2024-11-27
Payer: COMMERCIAL

## 2024-11-27 VITALS
SYSTOLIC BLOOD PRESSURE: 154 MMHG | TEMPERATURE: 97.3 F | HEART RATE: 58 BPM | WEIGHT: 198.41 LBS | DIASTOLIC BLOOD PRESSURE: 93 MMHG | OXYGEN SATURATION: 98 % | BODY MASS INDEX: 27.78 KG/M2 | HEIGHT: 71 IN | RESPIRATION RATE: 18 BRPM

## 2024-11-27 DIAGNOSIS — K64.4 BLEEDING EXTERNAL HEMORRHOIDS: ICD-10-CM

## 2024-11-27 DIAGNOSIS — Z01.818 ENCOUNTER FOR PREOPERATIVE EXAMINATION FOR GENERAL SURGICAL PROCEDURE: ICD-10-CM

## 2024-11-27 DIAGNOSIS — Z01.818 PREOPERATIVE EXAMINATION: Primary | ICD-10-CM

## 2024-11-27 LAB
ALBUMIN SERPL BCP-MCNC: 4.1 G/DL (ref 3.4–5)
ALP SERPL-CCNC: 60 U/L (ref 33–120)
ALT SERPL W P-5'-P-CCNC: 20 U/L (ref 10–52)
ANION GAP SERPL CALC-SCNC: 12 MMOL/L (ref 10–20)
AST SERPL W P-5'-P-CCNC: 22 U/L (ref 9–39)
BASOPHILS # BLD AUTO: 0.06 X10*3/UL (ref 0–0.1)
BASOPHILS NFR BLD AUTO: 0.7 %
BILIRUB SERPL-MCNC: 0.3 MG/DL (ref 0–1.2)
BUN SERPL-MCNC: 20 MG/DL (ref 6–23)
CALCIUM SERPL-MCNC: 8.9 MG/DL (ref 8.6–10.3)
CHLORIDE SERPL-SCNC: 106 MMOL/L (ref 98–107)
CO2 SERPL-SCNC: 25 MMOL/L (ref 21–32)
CREAT SERPL-MCNC: 0.92 MG/DL (ref 0.5–1.3)
EGFRCR SERPLBLD CKD-EPI 2021: >90 ML/MIN/1.73M*2
EOSINOPHIL # BLD AUTO: 0.06 X10*3/UL (ref 0–0.7)
EOSINOPHIL NFR BLD AUTO: 0.7 %
ERYTHROCYTE [DISTWIDTH] IN BLOOD BY AUTOMATED COUNT: 12.9 % (ref 11.5–14.5)
GLUCOSE SERPL-MCNC: 81 MG/DL (ref 74–99)
HCT VFR BLD AUTO: 37.6 % (ref 41–52)
HGB BLD-MCNC: 12.9 G/DL (ref 13.5–17.5)
IMM GRANULOCYTES # BLD AUTO: 0.02 X10*3/UL (ref 0–0.7)
IMM GRANULOCYTES NFR BLD AUTO: 0.2 % (ref 0–0.9)
LYMPHOCYTES # BLD AUTO: 2.55 X10*3/UL (ref 1.2–4.8)
LYMPHOCYTES NFR BLD AUTO: 31.8 %
MCH RBC QN AUTO: 31.3 PG (ref 26–34)
MCHC RBC AUTO-ENTMCNC: 34.3 G/DL (ref 32–36)
MCV RBC AUTO: 91 FL (ref 80–100)
MONOCYTES # BLD AUTO: 0.66 X10*3/UL (ref 0.1–1)
MONOCYTES NFR BLD AUTO: 8.2 %
NEUTROPHILS # BLD AUTO: 4.68 X10*3/UL (ref 1.2–7.7)
NEUTROPHILS NFR BLD AUTO: 58.4 %
NRBC BLD-RTO: 0 /100 WBCS (ref 0–0)
PLATELET # BLD AUTO: 262 X10*3/UL (ref 150–450)
POTASSIUM SERPL-SCNC: 4.3 MMOL/L (ref 3.5–5.3)
PROT SERPL-MCNC: 6.2 G/DL (ref 6.4–8.2)
RBC # BLD AUTO: 4.12 X10*6/UL (ref 4.5–5.9)
SODIUM SERPL-SCNC: 139 MMOL/L (ref 136–145)
WBC # BLD AUTO: 8 X10*3/UL (ref 4.4–11.3)

## 2024-11-27 PROCEDURE — 85025 COMPLETE CBC W/AUTO DIFF WBC: CPT

## 2024-11-27 PROCEDURE — 93005 ELECTROCARDIOGRAM TRACING: CPT

## 2024-11-27 PROCEDURE — 36415 COLL VENOUS BLD VENIPUNCTURE: CPT

## 2024-11-27 PROCEDURE — 93010 ELECTROCARDIOGRAM REPORT: CPT | Performed by: INTERNAL MEDICINE

## 2024-11-27 PROCEDURE — 80053 COMPREHEN METABOLIC PANEL: CPT

## 2024-11-27 RX ORDER — BISMUTH SUBSALICYLATE 262 MG
1 TABLET,CHEWABLE ORAL DAILY
COMMUNITY

## 2024-11-27 ASSESSMENT — DUKE ACTIVITY SCORE INDEX (DASI)
TOTAL_SCORE: 44.7
CAN YOU DO YARD WORK LIKE RAKING LEAVES, WEEDING OR PUSHING A MOWER: YES
CAN YOU DO HEAVY WORK AROUND THE HOUSE LIKE SCRUBBING FLOORS OR LIFTING AND MOVING HEAVY FURNITURE: YES
CAN YOU PARTICIPATE IN STRENOUS SPORTS LIKE SWIMMING, SINGLES TENNIS, FOOTBALL, BASKETBALL, OR SKIING: NO
CAN YOU PARTICIPATE IN MODERATE RECREATIONAL ACTIVITIES LIKE GOLF, BOWLING, DANCING, DOUBLES TENNIS OR THROWING A BASEBALL OR FOOTBALL: NO
CAN YOU DO MODERATE WORK AROUND THE HOUSE LIKE VACUUMING, SWEEPING FLOORS OR CARRYING GROCERIES: YES
CAN YOU RUN A SHORT DISTANCE: YES
DASI METS SCORE: 8.2
CAN YOU DO LIGHT WORK AROUND THE HOUSE LIKE DUSTING OR WASHING DISHES: YES
CAN YOU WALK A BLOCK OR TWO ON LEVEL GROUND: YES
CAN YOU HAVE SEXUAL RELATIONS: YES
CAN YOU CLIMB A FLIGHT OF STAIRS OR WALK UP A HILL: YES
CAN YOU TAKE CARE OF YOURSELF (EAT, DRESS, BATHE, OR USE TOILET): YES
CAN YOU WALK INDOORS, SUCH AS AROUND YOUR HOUSE: YES

## 2024-11-27 ASSESSMENT — ACTIVITIES OF DAILY LIVING (ADL): ADL_SCORE: 0

## 2024-11-27 ASSESSMENT — ENCOUNTER SYMPTOMS
GASTROINTESTINAL NEGATIVE: 1
RESPIRATORY NEGATIVE: 1
NECK NEGATIVE: 1
NEUROLOGICAL NEGATIVE: 1
CONSTITUTIONAL NEGATIVE: 1
EYES NEGATIVE: 1
MUSCULOSKELETAL NEGATIVE: 1
CARDIOVASCULAR NEGATIVE: 1

## 2024-11-27 ASSESSMENT — LIFESTYLE VARIABLES: SMOKING_STATUS: NONSMOKER

## 2024-11-27 NOTE — H&P (VIEW-ONLY)
CPM/PAT Evaluation       Name: Jordan Singh (Jordan Singh)  /Age: 1970/54 y.o.     Visit Type:   In-Person       Chief Complaint: Bleeding external hemorrhoids    HPI 55 y/o male scheduled for hemorrhoid excision on 12/3/2024 with  Dr. Aponte secondary to external hemorrhoids.  PMHX includes recurrent orchitis s/p orchiectomy.   PAT is consulted today for perioperative risk stratification and optimization.      Past Medical History:   Diagnosis Date    Bleeding external hemorrhoids     Carpal tunnel syndrome of left wrist        Past Surgical History:   Procedure Laterality Date    HAND FUSION      NASAL/SINUS ENDOSCOPY      TESTICLE SURGERY         Patient Sexual activity questions deferred to the physician.    No family history on file.    Allergies   Allergen Reactions    Cinnamon Hives     cinnamon    Codeine Itching and Unknown       Prior to Admission medications    Medication Sig Start Date End Date Taking? Authorizing Provider   aspirin 81 mg EC tablet Take 4 tablets (324 mg) by mouth once daily. Used for pain relief    Historical Provider, MD   doxazosin (Cardura) 2 mg tablet Take 1 tablet by mouth once daily in the evening  Patient not taking: Reported on 10/10/2024 3/4/24   Kaylin Toro MD   ibuprofen 800 mg tablet Take 1 tablet (800 mg) by mouth every 8 hours if needed for mild pain (1 - 3).    Historical Provider, MD HONG ROS:   Constitutional:   neg    Neuro/Psych:   neg    Eyes:   neg    Ears:   neg    Nose:   Mouth:   Throat:   Neck:   neg    Cardio:   neg    Respiratory:   neg    Endocrine:   GI:   neg    :   neg    Musculoskeletal:   neg    Hematologic:   neg    Skin:      Physical Exam  Vitals reviewed.   Constitutional:       Appearance: Normal appearance.   HENT:      Mouth/Throat:      Mouth: Mucous membranes are moist.      Pharynx: Oropharynx is clear.   Eyes:      Pupils: Pupils are equal, round, and reactive to light.   Cardiovascular:      Rate and Rhythm: Normal  rate and regular rhythm.      Pulses: Normal pulses.      Heart sounds: Normal heart sounds.   Pulmonary:      Effort: Pulmonary effort is normal.      Breath sounds: Normal breath sounds.   Abdominal:      General: Bowel sounds are normal.      Palpations: Abdomen is soft.   Musculoskeletal:         General: Normal range of motion.      Cervical back: Normal range of motion and neck supple.   Skin:     General: Skin is warm and dry.   Neurological:      General: No focal deficit present.      Mental Status: He is alert and oriented to person, place, and time.   Psychiatric:         Mood and Affect: Mood normal.         Behavior: Behavior normal.          Airway    Testing/Diagnostic:     Patient Specialist/PCP:     Visit Vitals  BP (!) 154/93   Pulse 58   Temp 36.3 °C (97.3 °F) (Temporal)   Resp 18       DASI Risk Score      Flowsheet Row Pre-Admission Testing from 11/27/2024 in [facility]   Can you take care of yourself (eat, dress, bathe, or use toilet)?  2.75 filed at 11/27/2024 1305   Can you walk indoors, such as around your house? 1.75 filed at 11/27/2024 1305   Can you walk a block or two on level ground?  2.75 filed at 11/27/2024 1305   Can you climb a flight of stairs or walk up a hill? 5.5 filed at 11/27/2024 1305   Can you run a short distance? 8 filed at 11/27/2024 1305   Can you do light work around the house like dusting or washing dishes? 2.7 filed at 11/27/2024 1305   Can you do moderate work around the house like vacuuming, sweeping floors or carrying groceries? 3.5 filed at 11/27/2024 1305   Can you do heavy work around the house like scrubbing floors or lifting and moving heavy furniture?  8 filed at 11/27/2024 1305   Can you do yard work like raking leaves, weeding or pushing a mower? 4.5 filed at 11/27/2024 1305   Can you have sexual relations? 5.25 filed at 11/27/2024 1305   Can you participate in moderate recreational activities like golf, bowling, dancing, doubles tennis or  throwing a baseball or football? 0 filed at 11/27/2024 1305   Can you participate in strenous sports like swimming, singles tennis, football, basketball, or skiing? 0 filed at 11/27/2024 1305   DASI SCORE 44.7 filed at 11/27/2024 1305   METS Score (Will be calculated only when all the questions are answered) 8.2 filed at 11/27/2024 1305          Caprini DVT Assessment      Flowsheet Row Pre-Admission Testing from 11/27/2024 in [facility]   DVT Score 3 filed at 11/27/2024 1320   Surgical Factors Minor surgery planned filed at 11/27/2024 1320   BMI 30 or less filed at 11/27/2024 1320          Modified Frailty Index    No data to display       CHADS2 Stroke Risk  Current as of 2 minutes ago        N/A 3 to 100%: High Risk   2 to < 3%: Medium Risk   0 to < 2%: Low Risk     Last Change: N/A          This score determines the patient's risk of having a stroke if the patient has atrial fibrillation.        This score is not applicable to this patient. Components are not calculated.          Revised Cardiac Risk Index      Flowsheet Row Pre-Admission Testing from 11/27/2024 in [facility]   High-Risk Surgery (Intraperitoneal, Intrathoracic,Suprainguinal vascular) 0 filed at 11/27/2024 1154   History of ischemic heart disease (History of MI, History of positive exercuse test, Current chest paint considered due to myocardial ischemia, Use of nitrate therapy, ECG with pathological Q Waves) 0 filed at 11/27/2024 1154   History of congestive heart failure (pulmonary edemia, bilateral rales or S3 gallop, Paroxysmal nocturnal dyspnea, CXR showing pulmonary vascular redistribution) 0 filed at 11/27/2024 1154   History of cerebrovascular disease (Prior TIA or stroke) 0 filed at 11/27/2024 1154   Pre-operative insulin treatment 0 filed at 11/27/2024 1154   Pre-operative creatinine>2 mg/dl 0 filed at 11/27/2024 1154   Revised Cardiac Risk Calculator 0 filed at 11/27/2024 1154          Apfel Simplified Score       Flowsheet Row Pre-Admission Testing from 2024 in [facility]   Smoking status 1 filed at 2024 1154   History of motion sickness or PONV  0 filed at 2024 1154   Use of postoperative opioids 1 filed at 2024 1154   Gender - Female 0=No filed at 2024 1154   Apfel Simplified Score Calculator 2 filed at 2024 1154          Risk Analysis Index Results This Encounter         2024  1306             Do you live in a place other than your own home?: 1    Any kidney failure, kidney not working well, or seeing a kidney doctor (nephrologist)? If yes, was this for kidney stones or another problem?: 0 No    Any history of chronic (long-term) congestive heart failure (CHF)?: 0 No    Any shortness of breath when resting?: 0 No    In the past five years, have you been diagnosed with or treated for cancer?: No    During the last 3 months has it become difficult for you to remember things or organize your thoughts?: 0 No    Have you lost weight of 10 pounds or more in the past 3 months without trying?: 0 No    Do you have any loss of appetitie?: 0 No    Getting Around (Mobility): 0 Can get around without help    Eatin Can plan and prepare own meals    Toiletin Can use toilet without any help    Personal Hygiene (Bathing, Hand Washing, Changing Clothes): 0 Can shower or bathe without any help    FARRAR Cancer History: Patient does not indicate history of cancer    Total Risk Analysis Index Score Without Cancer: 18    Total Risk Analysis Index Score: 18          Stop Bang Score      Flowsheet Row Pre-Admission Testing from 2024 in [facility]   Do you snore loudly? 0 filed at 2024 1305   Do you often feel tired or fatigued after your sleep? 0 filed at 2024 1305   Has anyone ever observed you stop breathing in your sleep? 0 filed at 2024 1305   Do you have or are you being treated for high blood pressure? 0 filed at 2024 1305   Recent  BMI (Calculated) 28.2 filed at 11/27/2024 1305   Is BMI greater than 35 kg/m2? 0=No filed at 11/27/2024 1305   Age older than 50 years old? 1=Yes filed at 11/27/2024 1305   Is your neck circumference greater than 17 inches (Male) or 16 inches (Female)? 0 filed at 11/27/2024 1305   Gender - Male 1=Yes filed at 11/27/2024 1305   STOP-BANG Total Score 2 filed at 11/27/2024 1305          Prodigy: High Risk  Total Score: 8              Prodigy Gender Score          ARISCAT Score for Postoperative Pulmonary Complications      Flowsheet Row Pre-Admission Testing from 11/27/2024 in [facility]   Age, years  3 filed at 11/27/2024 1155   Preoperative SpO2 0 filed at 11/27/2024 1155   Respiratory infection in the last month Either upper or lower (i.e., URI, bronchitis, pneumonia), with fever and antibiotic treatment 0 filed at 11/27/2024 1155   Preoperative anemoa (Hgb less than 10 g/dl) 0 filed at 11/27/2024 1155   Surgical incision  0 filed at 11/27/2024 1155   Duration of surgery  0 filed at 11/27/2024 1155   Emergency Procedure  0 filed at 11/27/2024 1155   ARISCAT Total Score  3 filed at 11/27/2024 1155          Roberto Perioperative Risk for Myocardial Infarction or Cardiac Arrest (BLANCHE)      Flowsheet Row Pre-Admission Testing from 11/27/2024 in [facility]   Age 1.04 filed at 11/27/2024 1155   Functional Status  0 filed at 11/27/2024 1155   ASA Class  -5.17 filed at 11/27/2024 1155   Creatinine 0 filed at 11/27/2024 1155   Type of Procedure  0.54 filed at 11/27/2024 1155   BLANCHE Total Score  -8.84 filed at 11/27/2024 1155   BLANCHE % 0.01 filed at 11/27/2024 1155                Assessment and Plan:   HPI 53 y/o male scheduled for hemorrhoid excision on 12/3/2024 with  Dr. Aponte secondary to external hemorrhoids.  PMHX includes recurrent orchitis s/p orchiectomy.   PAT is consulted today for perioperative risk stratification and optimization.    Neuro:  No neurologic diagnosis or significant  findings on chart review, clinical presentation and evaluation.  No grossly apparent neurologic perioperative risk.    Patient is not at increased risk for perioperative CVA      HEENT:  No HEENT diagnosis or significant findings on chart review or clinical presentation and evaluation. No further preoperative testing/intervention indicated at this time.    Cardiovascular:  No CV diagnosis or significant findings on chart review or clinical presentation and evaluation. No further preoperative testing or intervention is indicated at this time.  METS: 8.2  RCRI: 0 points, 3.9%  risk for postoperative MACE       Pulmonary:  No pulmonary diagnosis or significant findings on chart review or clinical presentation.  No further preoperative testing is indicated at this time.  Stop Bang score is 2 placing patient at low risk for TEODORO  PRODIGY: Low risk for opioid induced respiratory depression      Renal:   No renal diagnosis, however patient is at increase risk for perioperative renal complications secondary to  use of an ace, arb, or NSAID      Endocrine:  No endocrine diagnosis or significant findings on chart review or clinical presentation and evaluation. No further testing or intervention is indicated at this time.    Hematologic:  No hematologic diagnosis, however patient is at an increased risk for DVT  Caprini Score 3, patient at High risk for perioperative DVT.  Patient provided with VTE education/handout.    Gastrointestinal:   No GI diagnosis or significant findings on chart review or clinical presentation and evaluation.   Apfel 2    General Surgery  Seen by Dr. Aponte on 10/10/2024 for rectal bleeding  Plan for hemorrhoid excision    Infectious disease:   No infectious diagnosis or significant findings on chart review or clinical presentation and evaluation.     Musculoskeletal:   No diagnosis or significant findings on chart review or clinical presentation and evaluation.     Anesthesia/Airway:  No anesthesia  complications      Medication instructions and NPO guidelines reviewed with the patient.  All questions or concerns discussed and addressed.      Labs and EKG ordered

## 2024-11-27 NOTE — H&P (VIEW-ONLY)
CPM/PAT Evaluation       Name: Jordan Singh (Jordan Singh)  /Age: 1970/54 y.o.     Visit Type:   In-Person       Chief Complaint: Bleeding external hemorrhoids    HPI 53 y/o male scheduled for hemorrhoid excision on 12/3/2024 with  Dr. Aponte secondary to external hemorrhoids.  PMHX includes recurrent orchitis s/p orchiectomy.   PAT is consulted today for perioperative risk stratification and optimization.      Past Medical History:   Diagnosis Date    Bleeding external hemorrhoids     Carpal tunnel syndrome of left wrist        Past Surgical History:   Procedure Laterality Date    HAND FUSION      NASAL/SINUS ENDOSCOPY      TESTICLE SURGERY         Patient Sexual activity questions deferred to the physician.    No family history on file.    Allergies   Allergen Reactions    Cinnamon Hives     cinnamon    Codeine Itching and Unknown       Prior to Admission medications    Medication Sig Start Date End Date Taking? Authorizing Provider   aspirin 81 mg EC tablet Take 4 tablets (324 mg) by mouth once daily. Used for pain relief    Historical Provider, MD   doxazosin (Cardura) 2 mg tablet Take 1 tablet by mouth once daily in the evening  Patient not taking: Reported on 10/10/2024 3/4/24   Kaylin Toro MD   ibuprofen 800 mg tablet Take 1 tablet (800 mg) by mouth every 8 hours if needed for mild pain (1 - 3).    Historical Provider, MD HONG ROS:   Constitutional:   neg    Neuro/Psych:   neg    Eyes:   neg    Ears:   neg    Nose:   Mouth:   Throat:   Neck:   neg    Cardio:   neg    Respiratory:   neg    Endocrine:   GI:   neg    :   neg    Musculoskeletal:   neg    Hematologic:   neg    Skin:      Physical Exam  Vitals reviewed.   Constitutional:       Appearance: Normal appearance.   HENT:      Mouth/Throat:      Mouth: Mucous membranes are moist.      Pharynx: Oropharynx is clear.   Eyes:      Pupils: Pupils are equal, round, and reactive to light.   Cardiovascular:      Rate and Rhythm: Normal  rate and regular rhythm.      Pulses: Normal pulses.      Heart sounds: Normal heart sounds.   Pulmonary:      Effort: Pulmonary effort is normal.      Breath sounds: Normal breath sounds.   Abdominal:      General: Bowel sounds are normal.      Palpations: Abdomen is soft.   Musculoskeletal:         General: Normal range of motion.      Cervical back: Normal range of motion and neck supple.   Skin:     General: Skin is warm and dry.   Neurological:      General: No focal deficit present.      Mental Status: He is alert and oriented to person, place, and time.   Psychiatric:         Mood and Affect: Mood normal.         Behavior: Behavior normal.          Airway    Testing/Diagnostic:     Patient Specialist/PCP:     Visit Vitals  BP (!) 154/93   Pulse 58   Temp 36.3 °C (97.3 °F) (Temporal)   Resp 18       DASI Risk Score      Flowsheet Row Pre-Admission Testing from 11/27/2024 in [facility]   Can you take care of yourself (eat, dress, bathe, or use toilet)?  2.75 filed at 11/27/2024 1305   Can you walk indoors, such as around your house? 1.75 filed at 11/27/2024 1305   Can you walk a block or two on level ground?  2.75 filed at 11/27/2024 1305   Can you climb a flight of stairs or walk up a hill? 5.5 filed at 11/27/2024 1305   Can you run a short distance? 8 filed at 11/27/2024 1305   Can you do light work around the house like dusting or washing dishes? 2.7 filed at 11/27/2024 1305   Can you do moderate work around the house like vacuuming, sweeping floors or carrying groceries? 3.5 filed at 11/27/2024 1305   Can you do heavy work around the house like scrubbing floors or lifting and moving heavy furniture?  8 filed at 11/27/2024 1305   Can you do yard work like raking leaves, weeding or pushing a mower? 4.5 filed at 11/27/2024 1305   Can you have sexual relations? 5.25 filed at 11/27/2024 1305   Can you participate in moderate recreational activities like golf, bowling, dancing, doubles tennis or  throwing a baseball or football? 0 filed at 11/27/2024 1305   Can you participate in strenous sports like swimming, singles tennis, football, basketball, or skiing? 0 filed at 11/27/2024 1305   DASI SCORE 44.7 filed at 11/27/2024 1305   METS Score (Will be calculated only when all the questions are answered) 8.2 filed at 11/27/2024 1305          Caprini DVT Assessment      Flowsheet Row Pre-Admission Testing from 11/27/2024 in [facility]   DVT Score 3 filed at 11/27/2024 1320   Surgical Factors Minor surgery planned filed at 11/27/2024 1320   BMI 30 or less filed at 11/27/2024 1320          Modified Frailty Index    No data to display       CHADS2 Stroke Risk  Current as of 2 minutes ago        N/A 3 to 100%: High Risk   2 to < 3%: Medium Risk   0 to < 2%: Low Risk     Last Change: N/A          This score determines the patient's risk of having a stroke if the patient has atrial fibrillation.        This score is not applicable to this patient. Components are not calculated.          Revised Cardiac Risk Index      Flowsheet Row Pre-Admission Testing from 11/27/2024 in [facility]   High-Risk Surgery (Intraperitoneal, Intrathoracic,Suprainguinal vascular) 0 filed at 11/27/2024 1154   History of ischemic heart disease (History of MI, History of positive exercuse test, Current chest paint considered due to myocardial ischemia, Use of nitrate therapy, ECG with pathological Q Waves) 0 filed at 11/27/2024 1154   History of congestive heart failure (pulmonary edemia, bilateral rales or S3 gallop, Paroxysmal nocturnal dyspnea, CXR showing pulmonary vascular redistribution) 0 filed at 11/27/2024 1154   History of cerebrovascular disease (Prior TIA or stroke) 0 filed at 11/27/2024 1154   Pre-operative insulin treatment 0 filed at 11/27/2024 1154   Pre-operative creatinine>2 mg/dl 0 filed at 11/27/2024 1154   Revised Cardiac Risk Calculator 0 filed at 11/27/2024 1154          Apfel Simplified Score       Flowsheet Row Pre-Admission Testing from 2024 in [facility]   Smoking status 1 filed at 2024 1154   History of motion sickness or PONV  0 filed at 2024 1154   Use of postoperative opioids 1 filed at 2024 1154   Gender - Female 0=No filed at 2024 1154   Apfel Simplified Score Calculator 2 filed at 2024 1154          Risk Analysis Index Results This Encounter         2024  1306             Do you live in a place other than your own home?: 1    Any kidney failure, kidney not working well, or seeing a kidney doctor (nephrologist)? If yes, was this for kidney stones or another problem?: 0 No    Any history of chronic (long-term) congestive heart failure (CHF)?: 0 No    Any shortness of breath when resting?: 0 No    In the past five years, have you been diagnosed with or treated for cancer?: No    During the last 3 months has it become difficult for you to remember things or organize your thoughts?: 0 No    Have you lost weight of 10 pounds or more in the past 3 months without trying?: 0 No    Do you have any loss of appetitie?: 0 No    Getting Around (Mobility): 0 Can get around without help    Eatin Can plan and prepare own meals    Toiletin Can use toilet without any help    Personal Hygiene (Bathing, Hand Washing, Changing Clothes): 0 Can shower or bathe without any help    FARRAR Cancer History: Patient does not indicate history of cancer    Total Risk Analysis Index Score Without Cancer: 18    Total Risk Analysis Index Score: 18          Stop Bang Score      Flowsheet Row Pre-Admission Testing from 2024 in [facility]   Do you snore loudly? 0 filed at 2024 1305   Do you often feel tired or fatigued after your sleep? 0 filed at 2024 1305   Has anyone ever observed you stop breathing in your sleep? 0 filed at 2024 1305   Do you have or are you being treated for high blood pressure? 0 filed at 2024 1305   Recent  BMI (Calculated) 28.2 filed at 11/27/2024 1305   Is BMI greater than 35 kg/m2? 0=No filed at 11/27/2024 1305   Age older than 50 years old? 1=Yes filed at 11/27/2024 1305   Is your neck circumference greater than 17 inches (Male) or 16 inches (Female)? 0 filed at 11/27/2024 1305   Gender - Male 1=Yes filed at 11/27/2024 1305   STOP-BANG Total Score 2 filed at 11/27/2024 1305          Prodigy: High Risk  Total Score: 8              Prodigy Gender Score          ARISCAT Score for Postoperative Pulmonary Complications      Flowsheet Row Pre-Admission Testing from 11/27/2024 in [facility]   Age, years  3 filed at 11/27/2024 1155   Preoperative SpO2 0 filed at 11/27/2024 1155   Respiratory infection in the last month Either upper or lower (i.e., URI, bronchitis, pneumonia), with fever and antibiotic treatment 0 filed at 11/27/2024 1155   Preoperative anemoa (Hgb less than 10 g/dl) 0 filed at 11/27/2024 1155   Surgical incision  0 filed at 11/27/2024 1155   Duration of surgery  0 filed at 11/27/2024 1155   Emergency Procedure  0 filed at 11/27/2024 1155   ARISCAT Total Score  3 filed at 11/27/2024 1155          Roberto Perioperative Risk for Myocardial Infarction or Cardiac Arrest (BLANCHE)      Flowsheet Row Pre-Admission Testing from 11/27/2024 in [facility]   Age 1.04 filed at 11/27/2024 1155   Functional Status  0 filed at 11/27/2024 1155   ASA Class  -5.17 filed at 11/27/2024 1155   Creatinine 0 filed at 11/27/2024 1155   Type of Procedure  0.54 filed at 11/27/2024 1155   BLANCHE Total Score  -8.84 filed at 11/27/2024 1155   BLANCHE % 0.01 filed at 11/27/2024 1155                Assessment and Plan:   HPI 55 y/o male scheduled for hemorrhoid excision on 12/3/2024 with  Dr. Aponte secondary to external hemorrhoids.  PMHX includes recurrent orchitis s/p orchiectomy.   PAT is consulted today for perioperative risk stratification and optimization.    Neuro:  No neurologic diagnosis or significant  findings on chart review, clinical presentation and evaluation.  No grossly apparent neurologic perioperative risk.    Patient is not at increased risk for perioperative CVA      HEENT:  No HEENT diagnosis or significant findings on chart review or clinical presentation and evaluation. No further preoperative testing/intervention indicated at this time.    Cardiovascular:  No CV diagnosis or significant findings on chart review or clinical presentation and evaluation. No further preoperative testing or intervention is indicated at this time.  METS: 8.2  RCRI: 0 points, 3.9%  risk for postoperative MACE       Pulmonary:  No pulmonary diagnosis or significant findings on chart review or clinical presentation.  No further preoperative testing is indicated at this time.  Stop Bang score is 2 placing patient at low risk for TEODORO  PRODIGY: Low risk for opioid induced respiratory depression      Renal:   No renal diagnosis, however patient is at increase risk for perioperative renal complications secondary to  use of an ace, arb, or NSAID      Endocrine:  No endocrine diagnosis or significant findings on chart review or clinical presentation and evaluation. No further testing or intervention is indicated at this time.    Hematologic:  No hematologic diagnosis, however patient is at an increased risk for DVT  Caprini Score 3, patient at High risk for perioperative DVT.  Patient provided with VTE education/handout.    Gastrointestinal:   No GI diagnosis or significant findings on chart review or clinical presentation and evaluation.   Apfel 2    General Surgery  Seen by Dr. Aponte on 10/10/2024 for rectal bleeding  Plan for hemorrhoid excision    Infectious disease:   No infectious diagnosis or significant findings on chart review or clinical presentation and evaluation.     Musculoskeletal:   No diagnosis or significant findings on chart review or clinical presentation and evaluation.     Anesthesia/Airway:  No anesthesia  complications      Medication instructions and NPO guidelines reviewed with the patient.  All questions or concerns discussed and addressed.      Labs and EKG ordered

## 2024-11-27 NOTE — CPM/PAT H&P
CPM/PAT Evaluation       Name: Jordan Singh (Jordan Singh)  /Age: 1970/54 y.o.     Visit Type:   In-Person       Chief Complaint: Bleeding external hemorrhoids    HPI 53 y/o male scheduled for hemorrhoid excision on 12/3/2024 with  Dr. Aponte secondary to external hemorrhoids.  PMHX includes recurrent orchitis s/p orchiectomy.   PAT is consulted today for perioperative risk stratification and optimization.      Past Medical History:   Diagnosis Date    Bleeding external hemorrhoids     Carpal tunnel syndrome of left wrist        Past Surgical History:   Procedure Laterality Date    HAND FUSION      NASAL/SINUS ENDOSCOPY      TESTICLE SURGERY         Patient Sexual activity questions deferred to the physician.    No family history on file.    Allergies   Allergen Reactions    Cinnamon Hives     cinnamon    Codeine Itching and Unknown       Prior to Admission medications    Medication Sig Start Date End Date Taking? Authorizing Provider   aspirin 81 mg EC tablet Take 4 tablets (324 mg) by mouth once daily. Used for pain relief    Historical Provider, MD   doxazosin (Cardura) 2 mg tablet Take 1 tablet by mouth once daily in the evening  Patient not taking: Reported on 10/10/2024 3/4/24   Kaylin Toro MD   ibuprofen 800 mg tablet Take 1 tablet (800 mg) by mouth every 8 hours if needed for mild pain (1 - 3).    Historical Provider, MD HONG ROS:   Constitutional:   neg    Neuro/Psych:   neg    Eyes:   neg    Ears:   neg    Nose:   Mouth:   Throat:   Neck:   neg    Cardio:   neg    Respiratory:   neg    Endocrine:   GI:   neg    :   neg    Musculoskeletal:   neg    Hematologic:   neg    Skin:      Physical Exam  Vitals reviewed.   Constitutional:       Appearance: Normal appearance.   HENT:      Mouth/Throat:      Mouth: Mucous membranes are moist.      Pharynx: Oropharynx is clear.   Eyes:      Pupils: Pupils are equal, round, and reactive to light.   Cardiovascular:      Rate and Rhythm: Normal  rate and regular rhythm.      Pulses: Normal pulses.      Heart sounds: Normal heart sounds.   Pulmonary:      Effort: Pulmonary effort is normal.      Breath sounds: Normal breath sounds.   Abdominal:      General: Bowel sounds are normal.      Palpations: Abdomen is soft.   Musculoskeletal:         General: Normal range of motion.      Cervical back: Normal range of motion and neck supple.   Skin:     General: Skin is warm and dry.   Neurological:      General: No focal deficit present.      Mental Status: He is alert and oriented to person, place, and time.   Psychiatric:         Mood and Affect: Mood normal.         Behavior: Behavior normal.          Airway    Testing/Diagnostic:     Patient Specialist/PCP:     Visit Vitals  BP (!) 154/93   Pulse 58   Temp 36.3 °C (97.3 °F) (Temporal)   Resp 18       DASI Risk Score      Flowsheet Row Pre-Admission Testing from 11/27/2024 in [facility]   Can you take care of yourself (eat, dress, bathe, or use toilet)?  2.75 filed at 11/27/2024 1305   Can you walk indoors, such as around your house? 1.75 filed at 11/27/2024 1305   Can you walk a block or two on level ground?  2.75 filed at 11/27/2024 1305   Can you climb a flight of stairs or walk up a hill? 5.5 filed at 11/27/2024 1305   Can you run a short distance? 8 filed at 11/27/2024 1305   Can you do light work around the house like dusting or washing dishes? 2.7 filed at 11/27/2024 1305   Can you do moderate work around the house like vacuuming, sweeping floors or carrying groceries? 3.5 filed at 11/27/2024 1305   Can you do heavy work around the house like scrubbing floors or lifting and moving heavy furniture?  8 filed at 11/27/2024 1305   Can you do yard work like raking leaves, weeding or pushing a mower? 4.5 filed at 11/27/2024 1305   Can you have sexual relations? 5.25 filed at 11/27/2024 1305   Can you participate in moderate recreational activities like golf, bowling, dancing, doubles tennis or  throwing a baseball or football? 0 filed at 11/27/2024 1305   Can you participate in strenous sports like swimming, singles tennis, football, basketball, or skiing? 0 filed at 11/27/2024 1305   DASI SCORE 44.7 filed at 11/27/2024 1305   METS Score (Will be calculated only when all the questions are answered) 8.2 filed at 11/27/2024 1305          Caprini DVT Assessment      Flowsheet Row Pre-Admission Testing from 11/27/2024 in [facility]   DVT Score 3 filed at 11/27/2024 1320   Surgical Factors Minor surgery planned filed at 11/27/2024 1320   BMI 30 or less filed at 11/27/2024 1320          Modified Frailty Index    No data to display       CHADS2 Stroke Risk  Current as of 2 minutes ago        N/A 3 to 100%: High Risk   2 to < 3%: Medium Risk   0 to < 2%: Low Risk     Last Change: N/A          This score determines the patient's risk of having a stroke if the patient has atrial fibrillation.        This score is not applicable to this patient. Components are not calculated.          Revised Cardiac Risk Index      Flowsheet Row Pre-Admission Testing from 11/27/2024 in [facility]   High-Risk Surgery (Intraperitoneal, Intrathoracic,Suprainguinal vascular) 0 filed at 11/27/2024 1154   History of ischemic heart disease (History of MI, History of positive exercuse test, Current chest paint considered due to myocardial ischemia, Use of nitrate therapy, ECG with pathological Q Waves) 0 filed at 11/27/2024 1154   History of congestive heart failure (pulmonary edemia, bilateral rales or S3 gallop, Paroxysmal nocturnal dyspnea, CXR showing pulmonary vascular redistribution) 0 filed at 11/27/2024 1154   History of cerebrovascular disease (Prior TIA or stroke) 0 filed at 11/27/2024 1154   Pre-operative insulin treatment 0 filed at 11/27/2024 1154   Pre-operative creatinine>2 mg/dl 0 filed at 11/27/2024 1154   Revised Cardiac Risk Calculator 0 filed at 11/27/2024 1154          Apfel Simplified Score       Flowsheet Row Pre-Admission Testing from 2024 in [facility]   Smoking status 1 filed at 2024 1154   History of motion sickness or PONV  0 filed at 2024 1154   Use of postoperative opioids 1 filed at 2024 1154   Gender - Female 0=No filed at 2024 1154   Apfel Simplified Score Calculator 2 filed at 2024 1154          Risk Analysis Index Results This Encounter         2024  1306             Do you live in a place other than your own home?: 1    Any kidney failure, kidney not working well, or seeing a kidney doctor (nephrologist)? If yes, was this for kidney stones or another problem?: 0 No    Any history of chronic (long-term) congestive heart failure (CHF)?: 0 No    Any shortness of breath when resting?: 0 No    In the past five years, have you been diagnosed with or treated for cancer?: No    During the last 3 months has it become difficult for you to remember things or organize your thoughts?: 0 No    Have you lost weight of 10 pounds or more in the past 3 months without trying?: 0 No    Do you have any loss of appetitie?: 0 No    Getting Around (Mobility): 0 Can get around without help    Eatin Can plan and prepare own meals    Toiletin Can use toilet without any help    Personal Hygiene (Bathing, Hand Washing, Changing Clothes): 0 Can shower or bathe without any help    FARRAR Cancer History: Patient does not indicate history of cancer    Total Risk Analysis Index Score Without Cancer: 18    Total Risk Analysis Index Score: 18          Stop Bang Score      Flowsheet Row Pre-Admission Testing from 2024 in [facility]   Do you snore loudly? 0 filed at 2024 1305   Do you often feel tired or fatigued after your sleep? 0 filed at 2024 1305   Has anyone ever observed you stop breathing in your sleep? 0 filed at 2024 1305   Do you have or are you being treated for high blood pressure? 0 filed at 2024 1305   Recent  BMI (Calculated) 28.2 filed at 11/27/2024 1305   Is BMI greater than 35 kg/m2? 0=No filed at 11/27/2024 1305   Age older than 50 years old? 1=Yes filed at 11/27/2024 1305   Is your neck circumference greater than 17 inches (Male) or 16 inches (Female)? 0 filed at 11/27/2024 1305   Gender - Male 1=Yes filed at 11/27/2024 1305   STOP-BANG Total Score 2 filed at 11/27/2024 1305          Prodigy: High Risk  Total Score: 8              Prodigy Gender Score          ARISCAT Score for Postoperative Pulmonary Complications      Flowsheet Row Pre-Admission Testing from 11/27/2024 in [facility]   Age, years  3 filed at 11/27/2024 1155   Preoperative SpO2 0 filed at 11/27/2024 1155   Respiratory infection in the last month Either upper or lower (i.e., URI, bronchitis, pneumonia), with fever and antibiotic treatment 0 filed at 11/27/2024 1155   Preoperative anemoa (Hgb less than 10 g/dl) 0 filed at 11/27/2024 1155   Surgical incision  0 filed at 11/27/2024 1155   Duration of surgery  0 filed at 11/27/2024 1155   Emergency Procedure  0 filed at 11/27/2024 1155   ARISCAT Total Score  3 filed at 11/27/2024 1155          Roberto Perioperative Risk for Myocardial Infarction or Cardiac Arrest (BLANCHE)      Flowsheet Row Pre-Admission Testing from 11/27/2024 in [facility]   Age 1.04 filed at 11/27/2024 1155   Functional Status  0 filed at 11/27/2024 1155   ASA Class  -5.17 filed at 11/27/2024 1155   Creatinine 0 filed at 11/27/2024 1155   Type of Procedure  0.54 filed at 11/27/2024 1155   BLANCHE Total Score  -8.84 filed at 11/27/2024 1155   BLANCHE % 0.01 filed at 11/27/2024 1155                Assessment and Plan:   HPI 53 y/o male scheduled for hemorrhoid excision on 12/3/2024 with  Dr. Aponte secondary to external hemorrhoids.  PMHX includes recurrent orchitis s/p orchiectomy.   PAT is consulted today for perioperative risk stratification and optimization.    Neuro:  No neurologic diagnosis or significant  findings on chart review, clinical presentation and evaluation.  No grossly apparent neurologic perioperative risk.    Patient is not at increased risk for perioperative CVA      HEENT:  No HEENT diagnosis or significant findings on chart review or clinical presentation and evaluation. No further preoperative testing/intervention indicated at this time.    Cardiovascular:  No CV diagnosis or significant findings on chart review or clinical presentation and evaluation. No further preoperative testing or intervention is indicated at this time.  METS: 8.2  RCRI: 0 points, 3.9%  risk for postoperative MACE       Pulmonary:  No pulmonary diagnosis or significant findings on chart review or clinical presentation.  No further preoperative testing is indicated at this time.  Stop Bang score is 2 placing patient at low risk for TEODORO  PRODIGY: Low risk for opioid induced respiratory depression      Renal:   No renal diagnosis, however patient is at increase risk for perioperative renal complications secondary to  use of an ace, arb, or NSAID      Endocrine:  No endocrine diagnosis or significant findings on chart review or clinical presentation and evaluation. No further testing or intervention is indicated at this time.    Hematologic:  No hematologic diagnosis, however patient is at an increased risk for DVT  Caprini Score 3, patient at High risk for perioperative DVT.  Patient provided with VTE education/handout.    Gastrointestinal:   No GI diagnosis or significant findings on chart review or clinical presentation and evaluation.   Apfel 2    General Surgery  Seen by Dr. Aponte on 10/10/2024 for rectal bleeding  Plan for hemorrhoid excision    Infectious disease:   No infectious diagnosis or significant findings on chart review or clinical presentation and evaluation.     Musculoskeletal:   No diagnosis or significant findings on chart review or clinical presentation and evaluation.     Anesthesia/Airway:  No anesthesia  complications      Medication instructions and NPO guidelines reviewed with the patient.  All questions or concerns discussed and addressed.      Labs and EKG ordered

## 2024-11-27 NOTE — PREPROCEDURE INSTRUCTIONS
Thank you for visiting Preadmission Testing (PAT) today for your pre-procedure evaluation, you were seen by     Yadira Hess CNP  Pre Admission Testing  [facility]  [phone]    This summary includes instructions and information to aid you during your perioperative period.  Please read carefully. If you have any questions about your visit today, please call the number listed above.  If you become ill or have any changes to your health before your surgery, please contact your primary care provider and alert your surgeon.        Preparing for your Surgery       Exercises  Preoperative Deep Breathing Exercises  Why it is important to do deep breathing exercises before my surgery?  Deep breathing exercises strengthen your breathing muscles.  This helps you to recover after your surgery and decreases the chance of breathing complications.  How are the deep breathing exercises done?  Sit straight with your back supported.  Breathe in deeply and slowly through your nose. Your lower rib cage should expand and your abdomen may move forward.  Hold that breath for 3 to 5 seconds.  Breathe out through pursed lips, slowly and completely.  Rest and repeat 10 times every hour while awake.  Rest longer if you become dizzy or lightheaded.       Preoperative Brain Exercises    What are brain exercises?  A brain exercise is any activity that engages your thinking (cognitive) skills.    What types of activities are considered brain exercises?  Jigsaw puzzles, crossword puzzles, word jumble, memory games, word search, and many more.  Many can be found free online or on your phone via a mobile yogi.    Why should I do brain exercises before my surgery?  More recent research has shown brain exercise before surgery can lower the risk of postoperative delirium (confusion) which can be especially important for older adults.  Patients who did brain exercises for 5 to 10 hours the days before surgery, cut their  risk of postoperative delirium in half up to 1 week after surgery.    Sit-to-Stand Exercise    What is the sit-to-stand exercise?  The sit-to-stand exercise strengthens the muscles of your lower body and muscles in the center of your body (core muscles for stability) helping to maintain and improve your strength and mobility.  How do I do the sit-to-stand exercise?  The goal is to do this exercise without using your arms or hands.  If this is too difficult, use your arms and hands or a chair with armrests to help slowly push yourself to the standing position and lower yourself back to the sitting position. As the movement becomes easier use your arms and hands less.    Steps to the sit-to-stand exercise  Sit up tall in a sturdy chair, knees bent, feet flat on the floor shoulder-width apart.  Shift your hips/pelvis forward in the chair to correctly position yourself for the next movement.  Lean forward at your hips.  Stand up straight putting equal weight on both feet.  Check to be sure you are properly aligned with the chair, in a slow controlled movement sit back down.  Repeat this exercise 10-15 times.  If needed you can do it fewer times until your strength improves.  Rest for 1 minute.  Do another 10-15 sit-to-stand exercises.  Try to do this in the morning and evening.        Instructions    Preoperative Fasting Guidelines    Why must I stop eating and drinking near surgery time?  With sedation, food or liquid in your stomach can enter your lungs causing serious complications  Food can increase nausea and vomiting  When do I need to stop eating and drinking before my surgery?      Do not eat any food after midnight the night before your surgery/procedure. You may have up to 13.5 ounces of clear liquid until TWO hours before your instructed arrival time to the hospital.  This includes water, black tea/coffee, (no milk or cream) apple juice, and electrolyte drinks (Gatorade). You may chew gum until TWO hours  before your surgery/procedure            Simple things you can do to help prevent blood clots     Blood clots are blockages that can form in the body's veins. When a blood clot forms in your deep veins, it may be called a deep vein thrombosis, or DVT for short. Blood clots can happen in any part of the body where blood flows, but they are most common in the arms and legs. If a piece of a blood clot breaks free and travels to the lungs, it is called a pulmonary embolus (PE). A PE can be a very serious problem.         Being in the hospital or having surgery can raise your chances of getting a blood clot because you may not be well enough to move around as much as you normally do.         Ways you can help prevent blood clots in the hospital       Wearing SCDs  SCDs stands for Sequential Compression Devices.   SCDs are special sleeves that wrap around your legs. They attach to a pump that fills them with air to gently squeeze your legs every few minutes.  This helps return the blood in your legs to your heart.   SCDs should only be taken off when walking or bathing. SCDs may not be comfortable, but they can help save your life.              Pump SCD leg sleeves  Wearing compression stockings - if your doctor orders them. These special snug-fitting stockings gently squeeze your legs to help blood flow.       Walking. Walking helps move the blood in your legs.   If your doctor says it is ok, try walking the halls at least   5 times a day. Ask us to help you get up, so you don't fall.      Taking any blood-thinning medicines your doctor orders.              Ways you can help prevent blood clots at home         Wearing compression stockings - if your doctor orders them.   Walking - to help move the blood in your legs.    Taking any blood-thinning medicines your doctor orders.      Signs of a blood clot or PE    Tell your doctor or nurse right away if you have any of the problems listed below.         If you are at home,  seek medical care right away. Call 911 for chest pain or problems breathing.            Signs of a blood clot (DVT) - such as pain, swelling, redness, or warmth in your arm or legs.  Signs of a pulmonary embolism (PE) - such as chest pain or feeling short of breath      Tobacco and Alcohol;  Do not drink alcohol or smoke within 24 hours of surgery.  It is best to quit smoking for as long as possible before any surgery or procedure.        The Week before Surgery        Seven days before Surgery  Check your PAT medication instructions  Do the exercises provided to you by PAT  Arrange for a responsible, adult licensed  to take you home after surgery and stay with you for 24 hours.  You will not be permitted to drive yourself home if you have received any anesthetic/sedation  Six days before surgery  Check your PAT medication instructions  Do the exercises provided to you by PAT  Start using Chlorhexidene (CHG) body wash if prescribed  Five days before surgery  Check your PAT medication instructions  Do the exercises provided to you by PAT  Continue to use CHG body wash if prescribed  Three days before surgery  Check your PAT medication instructions  Do the exercises provided to you by PAT  Continue to use CHG body wash if prescribed  Two days before surgery  Check your PAT medication instructions  Do the exercises provided to you by PAT  Continue to use CHG body wash if prescribed    The Day before Surgery       Check your PAT medication and all other PAT instructions including when to stop eating and drinking  You will be called with your arrival time for surgery in the late afternoon.  If you do not receive a call please reach out to [facility]. [phone]  Do not smoke or drink 24 hours before surgery  Prepare items to bring with you to the hospital  Shower with your chlorhexidine wash if prescribed  Brush your teeth and use your chlorhexidine dental rinse if prescribed    The Day of Surgery       Check  your PAT medication instructions  Ensure you follow the instructions for when to stop eating and drinking  Shower, if prescribed use CHG.  Do not apply any lotions, creams, moisturizers, perfume or deodorant  Brush your teeth and use your CHG dental rinse if prescribed  Wear loose comfortable clothing  Avoid make-up  Remove  jewelry and piercings, consider professional piercing removal with a plastic spacer if needed  Bring photo ID and Insurance card  Bring an accurate medication list that includes medication dose, frequency and allergies  Bring a copy of your advanced directives (will, health care power of )  Bring any devices and controllers as well as medical devices you have been provided with for surgery (CPAP, slings, braces, etc.)  Dentures, eyeglasses, and contacts will be removed before surgery, please bring cases for contacts or glasses

## 2024-11-30 LAB
ATRIAL RATE: 56 BPM
P AXIS: 78 DEGREES
P OFFSET: 188 MS
P ONSET: 129 MS
PR INTERVAL: 200 MS
Q ONSET: 229 MS
QRS COUNT: 9 BEATS
QRS DURATION: 88 MS
QT INTERVAL: 388 MS
QTC CALCULATION(BAZETT): 374 MS
QTC FREDERICIA: 379 MS
R AXIS: -44 DEGREES
T AXIS: 24 DEGREES
T OFFSET: 423 MS
VENTRICULAR RATE: 56 BPM

## 2024-12-03 ENCOUNTER — ANESTHESIA EVENT (OUTPATIENT)
Dept: OPERATING ROOM | Facility: CLINIC | Age: 54
End: 2024-12-03
Payer: COMMERCIAL

## 2024-12-03 ENCOUNTER — ANESTHESIA (OUTPATIENT)
Dept: OPERATING ROOM | Facility: HOSPITAL | Age: 54
End: 2024-12-03
Payer: COMMERCIAL

## 2024-12-03 ENCOUNTER — HOSPITAL ENCOUNTER (OUTPATIENT)
Facility: HOSPITAL | Age: 54
Setting detail: OUTPATIENT SURGERY
Discharge: HOME | End: 2024-12-03
Attending: SURGERY | Admitting: SURGERY
Payer: COMMERCIAL

## 2024-12-03 VITALS
OXYGEN SATURATION: 100 % | HEART RATE: 64 BPM | BODY MASS INDEX: 27.47 KG/M2 | DIASTOLIC BLOOD PRESSURE: 72 MMHG | SYSTOLIC BLOOD PRESSURE: 132 MMHG | TEMPERATURE: 97.7 F | HEIGHT: 71 IN | WEIGHT: 196.21 LBS | RESPIRATION RATE: 18 BRPM

## 2024-12-03 DIAGNOSIS — G89.18 ACUTE POST-OPERATIVE PAIN: Primary | ICD-10-CM

## 2024-12-03 DIAGNOSIS — K64.4 BLEEDING EXTERNAL HEMORRHOIDS: ICD-10-CM

## 2024-12-03 PROCEDURE — 3600000007 HC OR TIME - EACH INCREMENTAL 1 MINUTE - PROCEDURE LEVEL TWO: Performed by: SURGERY

## 2024-12-03 PROCEDURE — 3600000002 HC OR TIME - INITIAL BASE CHARGE - PROCEDURE LEVEL TWO: Performed by: SURGERY

## 2024-12-03 PROCEDURE — 2720000007 HC OR 272 NO HCPCS: Performed by: SURGERY

## 2024-12-03 PROCEDURE — 46260 REMOVE IN/EX HEM GROUPS 2+: CPT | Performed by: SURGERY

## 2024-12-03 PROCEDURE — 7100000002 HC RECOVERY ROOM TIME - EACH INCREMENTAL 1 MINUTE: Performed by: SURGERY

## 2024-12-03 PROCEDURE — 7100000009 HC PHASE TWO TIME - INITIAL BASE CHARGE: Performed by: SURGERY

## 2024-12-03 PROCEDURE — 2500000004 HC RX 250 GENERAL PHARMACY W/ HCPCS (ALT 636 FOR OP/ED): Performed by: ANESTHESIOLOGY

## 2024-12-03 PROCEDURE — 2500000001 HC RX 250 WO HCPCS SELF ADMINISTERED DRUGS (ALT 637 FOR MEDICARE OP): Performed by: SURGERY

## 2024-12-03 PROCEDURE — 88304 TISSUE EXAM BY PATHOLOGIST: CPT | Performed by: PATHOLOGY

## 2024-12-03 PROCEDURE — 2500000004 HC RX 250 GENERAL PHARMACY W/ HCPCS (ALT 636 FOR OP/ED): Performed by: NURSE ANESTHETIST, CERTIFIED REGISTERED

## 2024-12-03 PROCEDURE — 2500000004 HC RX 250 GENERAL PHARMACY W/ HCPCS (ALT 636 FOR OP/ED): Performed by: SURGERY

## 2024-12-03 PROCEDURE — 3700000002 HC GENERAL ANESTHESIA TIME - EACH INCREMENTAL 1 MINUTE: Performed by: SURGERY

## 2024-12-03 PROCEDURE — 7100000001 HC RECOVERY ROOM TIME - INITIAL BASE CHARGE: Performed by: SURGERY

## 2024-12-03 PROCEDURE — 3700000001 HC GENERAL ANESTHESIA TIME - INITIAL BASE CHARGE: Performed by: SURGERY

## 2024-12-03 PROCEDURE — A46260 PR HEMORRHOIDECTOMY,INT/EXT, 2+ COLUMNS/GROUPS: Performed by: NURSE ANESTHETIST, CERTIFIED REGISTERED

## 2024-12-03 PROCEDURE — 88304 TISSUE EXAM BY PATHOLOGIST: CPT | Mod: TC,GEALAB | Performed by: SURGERY

## 2024-12-03 RX ORDER — PROPOFOL 10 MG/ML
INJECTION, EMULSION INTRAVENOUS AS NEEDED
Status: DISCONTINUED | OUTPATIENT
Start: 2024-12-03 | End: 2024-12-03

## 2024-12-03 RX ORDER — ONDANSETRON HYDROCHLORIDE 2 MG/ML
4 INJECTION, SOLUTION INTRAVENOUS ONCE AS NEEDED
Status: DISCONTINUED | OUTPATIENT
Start: 2024-12-03 | End: 2024-12-03 | Stop reason: HOSPADM

## 2024-12-03 RX ORDER — CEFAZOLIN 1 G/1
INJECTION, POWDER, FOR SOLUTION INTRAVENOUS AS NEEDED
Status: DISCONTINUED | OUTPATIENT
Start: 2024-12-03 | End: 2024-12-03

## 2024-12-03 RX ORDER — DOCUSATE SODIUM 100 MG/1
100 CAPSULE, LIQUID FILLED ORAL 2 TIMES DAILY
Qty: 20 CAPSULE | Refills: 0 | Status: SHIPPED | OUTPATIENT
Start: 2024-12-03 | End: 2024-12-13

## 2024-12-03 RX ORDER — ONDANSETRON HYDROCHLORIDE 2 MG/ML
INJECTION, SOLUTION INTRAVENOUS AS NEEDED
Status: DISCONTINUED | OUTPATIENT
Start: 2024-12-03 | End: 2024-12-03

## 2024-12-03 RX ORDER — CEFAZOLIN SODIUM 2 G/100ML
2 INJECTION, SOLUTION INTRAVENOUS ONCE
Status: DISCONTINUED | OUTPATIENT
Start: 2024-12-03 | End: 2024-12-03 | Stop reason: HOSPADM

## 2024-12-03 RX ORDER — DIPHENHYDRAMINE HYDROCHLORIDE 50 MG/ML
12.5 INJECTION INTRAMUSCULAR; INTRAVENOUS ONCE AS NEEDED
Status: DISCONTINUED | OUTPATIENT
Start: 2024-12-03 | End: 2024-12-03 | Stop reason: HOSPADM

## 2024-12-03 RX ORDER — LIDOCAINE AND PRILOCAINE 25; 25 MG/G; MG/G
CREAM TOPICAL AS NEEDED
Status: DISCONTINUED | OUTPATIENT
Start: 2024-12-03 | End: 2024-12-03 | Stop reason: HOSPADM

## 2024-12-03 RX ORDER — OXYCODONE HYDROCHLORIDE 5 MG/1
5 TABLET ORAL EVERY 6 HOURS PRN
Qty: 20 TABLET | Refills: 0 | Status: SHIPPED | OUTPATIENT
Start: 2024-12-03 | End: 2024-12-08

## 2024-12-03 RX ORDER — DEXMEDETOMIDINE IN 0.9 % NACL 20 MCG/5ML
SYRINGE (ML) INTRAVENOUS AS NEEDED
Status: DISCONTINUED | OUTPATIENT
Start: 2024-12-03 | End: 2024-12-03

## 2024-12-03 RX ORDER — SODIUM CHLORIDE, SODIUM LACTATE, POTASSIUM CHLORIDE, CALCIUM CHLORIDE 600; 310; 30; 20 MG/100ML; MG/100ML; MG/100ML; MG/100ML
100 INJECTION, SOLUTION INTRAVENOUS CONTINUOUS
Status: DISCONTINUED | OUTPATIENT
Start: 2024-12-03 | End: 2024-12-03 | Stop reason: HOSPADM

## 2024-12-03 RX ORDER — OXYCODONE HYDROCHLORIDE 5 MG/1
5 TABLET ORAL EVERY 4 HOURS PRN
Status: DISCONTINUED | OUTPATIENT
Start: 2024-12-03 | End: 2024-12-03 | Stop reason: HOSPADM

## 2024-12-03 RX ORDER — LIDOCAINE HYDROCHLORIDE AND EPINEPHRINE 10; 10 UG/ML; MG/ML
INJECTION, SOLUTION INFILTRATION; PERINEURAL AS NEEDED
Status: DISCONTINUED | OUTPATIENT
Start: 2024-12-03 | End: 2024-12-03 | Stop reason: HOSPADM

## 2024-12-03 RX ORDER — KETOROLAC TROMETHAMINE 30 MG/ML
INJECTION, SOLUTION INTRAMUSCULAR; INTRAVENOUS AS NEEDED
Status: DISCONTINUED | OUTPATIENT
Start: 2024-12-03 | End: 2024-12-03

## 2024-12-03 RX ORDER — DROPERIDOL 2.5 MG/ML
0.62 INJECTION, SOLUTION INTRAMUSCULAR; INTRAVENOUS ONCE AS NEEDED
Status: DISCONTINUED | OUTPATIENT
Start: 2024-12-03 | End: 2024-12-03 | Stop reason: HOSPADM

## 2024-12-03 RX ORDER — SODIUM CHLORIDE, SODIUM LACTATE, POTASSIUM CHLORIDE, CALCIUM CHLORIDE 600; 310; 30; 20 MG/100ML; MG/100ML; MG/100ML; MG/100ML
20 INJECTION, SOLUTION INTRAVENOUS CONTINUOUS
Status: DISCONTINUED | OUTPATIENT
Start: 2024-12-03 | End: 2024-12-03 | Stop reason: HOSPADM

## 2024-12-03 RX ORDER — MIDAZOLAM HYDROCHLORIDE 1 MG/ML
INJECTION INTRAMUSCULAR; INTRAVENOUS AS NEEDED
Status: DISCONTINUED | OUTPATIENT
Start: 2024-12-03 | End: 2024-12-03

## 2024-12-03 RX ORDER — MEPERIDINE HYDROCHLORIDE 25 MG/ML
12.5 INJECTION INTRAMUSCULAR; INTRAVENOUS; SUBCUTANEOUS EVERY 10 MIN PRN
Status: DISCONTINUED | OUTPATIENT
Start: 2024-12-03 | End: 2024-12-03 | Stop reason: HOSPADM

## 2024-12-03 RX ORDER — ROCURONIUM BROMIDE 10 MG/ML
INJECTION, SOLUTION INTRAVENOUS AS NEEDED
Status: DISCONTINUED | OUTPATIENT
Start: 2024-12-03 | End: 2024-12-03

## 2024-12-03 RX ORDER — ALBUTEROL SULFATE 0.83 MG/ML
2.5 SOLUTION RESPIRATORY (INHALATION) ONCE AS NEEDED
Status: DISCONTINUED | OUTPATIENT
Start: 2024-12-03 | End: 2024-12-03 | Stop reason: HOSPADM

## 2024-12-03 RX ORDER — FENTANYL CITRATE 50 UG/ML
INJECTION, SOLUTION INTRAMUSCULAR; INTRAVENOUS AS NEEDED
Status: DISCONTINUED | OUTPATIENT
Start: 2024-12-03 | End: 2024-12-03

## 2024-12-03 SDOH — HEALTH STABILITY: MENTAL HEALTH: CURRENT SMOKER: 1

## 2024-12-03 ASSESSMENT — PAIN SCALES - GENERAL
PAINLEVEL_OUTOF10: 0 - NO PAIN
PAIN_LEVEL: 1
PAINLEVEL_OUTOF10: 0 - NO PAIN

## 2024-12-03 ASSESSMENT — PAIN - FUNCTIONAL ASSESSMENT
PAIN_FUNCTIONAL_ASSESSMENT: 0-10

## 2024-12-03 NOTE — ANESTHESIA PROCEDURE NOTES
Airway  Date/Time: 12/3/2024 8:10 AM  Urgency: elective    Airway not difficult    Staffing  Performed: CRNA   Authorized by: CATHIE Parham    Performed by: CATHIE Parham  Patient location during procedure: OR    Indications and Patient Condition  Indications for airway management: anesthesia and airway protection  Spontaneous ventilation: present  Sedation level: deep  Preoxygenated: yes  Patient position: sniffing  MILS maintained throughout  Mask difficulty assessment: 1 - vent by mask    Final Airway Details  Final airway type: endotracheal airway      Successful airway: ETT  Cuffed: yes   Successful intubation technique: video laryngoscopy  Facilitating devices/methods: intubating stylet  Endotracheal tube insertion site: oral  Blade: Navya  Blade size: #3  ETT size (mm): 7.5  Inital cuff pressure (cm H2O): 22  Measured from: teeth

## 2024-12-03 NOTE — ANESTHESIA POSTPROCEDURE EVALUATION
Patient: Jordan Singh    Procedure Summary       Date: 12/03/24 Room / Location: [facility] OR 07 / [facility]    Anesthesia Start: 0759 Anesthesia Stop: 0843    Procedure: EXCISION HEMORRHOID (Anus) Diagnosis:       Bleeding external hemorrhoids      (Bleeding external hemorrhoids [K64.4])    Surgeons: Jordon JIMENES MD Responsible Provider: CATHIE Parham    Anesthesia Type: general ASA Status: 2            Anesthesia Type: general    Vitals Value Taken Time   /69 12/03/24 0844   Temp 36.6 12/03/24 0844   Pulse 77 12/03/24 0844   Resp 12 12/03/24 0844   SpO2 96 12/03/24 0844       Anesthesia Post Evaluation    Patient location during evaluation: PACU  Patient participation: complete - patient participated  Level of consciousness: awake and awake and alert  Pain score: 1  Pain management: adequate  Multimodal analgesia pain management approach  Airway patency: patent  Two or more strategies used to mitigate risk of obstructive sleep apnea  Cardiovascular status: acceptable  Respiratory status: acceptable  Hydration status: acceptable  Postoperative Nausea and Vomiting: none        No notable events documented.

## 2024-12-03 NOTE — ANESTHESIA PREPROCEDURE EVALUATION
Patient: Jordan Singh    Procedure Information       Anesthesia Start Date/Time: 12/03/24 0759    Procedure: EXCISION HEMORRHOID (Anus)    Location: [facility] OR 07 / [facility] OR    Surgeons: Jordon JIMENES MD            Relevant Problems   Neuro   (+) Carpal tunnel syndrome of left wrist      GI   (+) Bleeding external hemorrhoids      Musculoskeletal   (+) Carpal tunnel syndrome of left wrist       Clinical information reviewed:   Tobacco  Allergies  Meds   Med Hx  Surg Hx   Fam Hx  Soc Hx        NPO Detail:  NPO/Void Status  Date of Last Solid: 12/02/24  Time of Last Solid: 1800  Last Intake Type: Food         Physical Exam    Airway  Mallampati: I  TM distance: >3 FB  Neck ROM: full     Cardiovascular   Rhythm: regular  Rate: normal     Dental    Pulmonary    Abdominal            Anesthesia Plan    History of general anesthesia?: yes  History of complications of general anesthesia?: no    ASA 2     general     The patient is a current smoker.  Patient was previously instructed to abstain from smoking on day of procedure.  Patient did not smoke on day of procedure.    intravenous induction   Postoperative administration of opioids is intended.  Anesthetic plan and risks discussed with patient.  Use of blood products discussed with patient who.    Plan discussed with CRNA.

## 2024-12-03 NOTE — OP NOTE
EXCISION HEMORRHOID Operative Note     Date: 12/3/2024  OR Location: [facility] OR    Name: Jordan Singh, : 1970, Age: 54 y.o., MRN: [id number], Sex: male    Diagnosis  Pre-op Diagnosis      * Bleeding external hemorrhoids [K64.4] Post-op Diagnosis     * Bleeding external hemorrhoids [K64.4]     Procedures  EXCISION HEMORRHOID  90891 - FL EXC THROMBOSED HEMORRHOID XTRNL      Surgeons      * Jordon Aponte V - Primary    Resident/Fellow/Other Assistant:  Surgeons and Role:  * No surgeons found with a matching role *    Staff:   Circulator: Reanna  Scrub Person: Bossman  Surgical Assistant: Radha  Scrub Person: Blanca    Anesthesia Staff: CRNA: CATHIE Parham    Procedure Summary  Anesthesia: Anesthesia type not filed in the log.  ASA: ASA status not filed in the log.  Estimated Blood Loss: 10 mL  Intra-op Medications:   Administrations occurring from 0745 to 0900 on 24:   Medication Name Total Dose   lidocaine-epinephrine (Xylocaine W/EPI) 1 %-1:100,000 injection 40 mL   ceFAZolin (Ancef) vial 1 g 2 g   dexAMETHasone (Decadron) 4 mg/mL 4 mg   dexmedeTOMidine 4 mcg/mL in NS 5 mL syringe 6 mcg   fentaNYL (Sublimaze) injection 50 mcg/mL 100 mcg   ketorolac (Toradol) 30 mg 30 mg   midazolam PF (Versed) injection 1 mg/mL 2 mg   ondansetron 2 mg/mL 4 mg   propofol (Diprivan) injection 10 mg/mL 200 mg   rocuronium (ZeMuron) 50 mg/5 mL injection 50 mg   sugammadex (Bridion) 200 mg/2 mL injection 200 mg              Anesthesia Record               Intraprocedure I/O Totals       None           Specimen:   ID Type Source Tests Collected by Time   1 : LEFT LATERAL HEMORRHOID Tissue HEMORRHOID SURGICAL PATHOLOGY EXAM Jordon JIMENES MD 12/3/2024 0827   2 : RIGHT POSTERIOR HEMORRHOID Tissue HEMORRHOID SURGICAL PATHOLOGY EXAM Jordon JIMENES MD 12/3/2024 0830              Findings: Enlarged, grade IV left lateral hemorrhoid column.  Grade II right posterior hemorrhoid column.      Indications: Jordan iSngh is  an 54 y.o. male who is having surgery for Bleeding external hemorrhoids [K64.4].  The patient presents to my clinic for evaluation of grade 4, protruding and bleeding hemorrhoids.  He has been dealing with these hemorrhoids for several years now.  I explained to him the risks and benefits going the operating room for a rectal exam under anesthesia and hemorrhoidectomy.  These risks included the risk of bleeding, infection, anal stenosis, incontinence, or injury to surrounding structures.  The patient agreed to proceed with the operation.    The patient was seen in the preoperative area. The risks, benefits, complications, treatment options, non-operative alternatives, expected recovery and outcomes were discussed with the patient. The possibilities of reaction to medication, pulmonary aspiration, injury to surrounding structures, bleeding, recurrent infection, the need for additional procedures, failure to diagnose a condition, and creating a complication requiring transfusion or operation were discussed with the patient. The patient concurred with the proposed plan, giving informed consent.  The site of surgery was properly noted/marked if necessary per policy. The patient has been actively warmed in preoperative area. Preoperative antibiotics have been ordered and given within 1 hours of incision. Venous thrombosis prophylaxis have been ordered including bilateral sequential compression devices    Procedure Details:   The patient was brought to the operating room, general endotracheal anesthesia was induced, and he was placed in the prone position on the operating table.  We made certain to pad all pressure points along his face, neck, chest, arms, hips, knees, and legs.  We then prepped and draped his perianal area with Betadine.  We began by first performing a digital rectal exam.  He was noted to have enlarged left lateral hemorrhoid column that was protruding, and grade 4.  He also had an enlarged right  posterior hemorrhoid column that was grade 2.  We began by first injecting a one-to-one combination of 1% lidocaine and 0.5% bupivacaine.  We then made a small incision along the left lateral hemorrhoid column at its base.  We then used a LigaSure device in order to dissect the remainder of the hemorrhoid down to its source.  We made sure to stay superficial to the muscle layer.  We then resected the right posterior hemorrhoid column in similar fashion.  We irrigated the wounds, and achieved hemostasis.  We then placed some Dibucaine ointment into the rectum.  We covered the area with gauze followed by ABD pads and mesh underwear.  At the end of the procedure all needle, sponge, and instrument counts were correct.  The patient tolerated the procedure well and was brought to the postanesthesia care unit for recovery.      Complications:  None; patient tolerated the procedure well.    Disposition: PACU - hemodynamically stable.  Condition: stable         Task Performed by RNFA or Surgical Assistant:  No qualified resident was available to assist.     An assistant was used throughout the case and assisted in retraction, visualization, and improved the flow of the case.      Attending Attestation: I was present and scrubbed for the entire procedure.    Jordon Aponte V  Phone Number: [phone]

## 2024-12-13 ENCOUNTER — HOSPITAL ENCOUNTER (OUTPATIENT)
Facility: CLINIC | Age: 54
Setting detail: OUTPATIENT SURGERY
Discharge: HOME | End: 2024-12-13
Attending: ORTHOPAEDIC SURGERY | Admitting: ORTHOPAEDIC SURGERY
Payer: COMMERCIAL

## 2024-12-13 ENCOUNTER — ANESTHESIA (OUTPATIENT)
Dept: OPERATING ROOM | Facility: CLINIC | Age: 54
End: 2024-12-13
Payer: COMMERCIAL

## 2024-12-13 VITALS
DIASTOLIC BLOOD PRESSURE: 75 MMHG | SYSTOLIC BLOOD PRESSURE: 135 MMHG | WEIGHT: 198.85 LBS | HEART RATE: 57 BPM | HEIGHT: 71 IN | BODY MASS INDEX: 27.84 KG/M2 | RESPIRATION RATE: 16 BRPM | TEMPERATURE: 97.7 F | OXYGEN SATURATION: 95 %

## 2024-12-13 DIAGNOSIS — G56.02 CARPAL TUNNEL SYNDROME OF LEFT WRIST: ICD-10-CM

## 2024-12-13 PROCEDURE — 7100000009 HC PHASE TWO TIME - INITIAL BASE CHARGE: Performed by: ORTHOPAEDIC SURGERY

## 2024-12-13 PROCEDURE — 2500000004 HC RX 250 GENERAL PHARMACY W/ HCPCS (ALT 636 FOR OP/ED): Performed by: ANESTHESIOLOGY

## 2024-12-13 PROCEDURE — 2500000004 HC RX 250 GENERAL PHARMACY W/ HCPCS (ALT 636 FOR OP/ED): Performed by: ORTHOPAEDIC SURGERY

## 2024-12-13 PROCEDURE — 26115 EXC HAND LES SC < 1.5 CM: CPT | Performed by: ORTHOPAEDIC SURGERY

## 2024-12-13 PROCEDURE — 3700000001 HC GENERAL ANESTHESIA TIME - INITIAL BASE CHARGE: Performed by: ORTHOPAEDIC SURGERY

## 2024-12-13 PROCEDURE — 7100000010 HC PHASE TWO TIME - EACH INCREMENTAL 1 MINUTE: Performed by: ORTHOPAEDIC SURGERY

## 2024-12-13 PROCEDURE — 2500000005 HC RX 250 GENERAL PHARMACY W/O HCPCS: Performed by: ORTHOPAEDIC SURGERY

## 2024-12-13 PROCEDURE — 88305 TISSUE EXAM BY PATHOLOGIST: CPT | Performed by: PATHOLOGY

## 2024-12-13 PROCEDURE — 0753T DGTZ GLS MCRSCP SLD LEVEL IV: CPT | Mod: TC,SUR | Performed by: ORTHOPAEDIC SURGERY

## 2024-12-13 PROCEDURE — 3600000008 HC OR TIME - EACH INCREMENTAL 1 MINUTE - PROCEDURE LEVEL THREE: Performed by: ORTHOPAEDIC SURGERY

## 2024-12-13 PROCEDURE — 64721 CARPAL TUNNEL SURGERY: CPT | Performed by: ORTHOPAEDIC SURGERY

## 2024-12-13 PROCEDURE — 3700000002 HC GENERAL ANESTHESIA TIME - EACH INCREMENTAL 1 MINUTE: Performed by: ORTHOPAEDIC SURGERY

## 2024-12-13 PROCEDURE — 3600000003 HC OR TIME - INITIAL BASE CHARGE - PROCEDURE LEVEL THREE: Performed by: ORTHOPAEDIC SURGERY

## 2024-12-13 RX ORDER — LIDOCAINE HYDROCHLORIDE AND EPINEPHRINE 10; 10 UG/ML; MG/ML
INJECTION, SOLUTION INFILTRATION; PERINEURAL AS NEEDED
Status: DISCONTINUED | OUTPATIENT
Start: 2024-12-13 | End: 2024-12-13 | Stop reason: HOSPADM

## 2024-12-13 RX ORDER — MIDAZOLAM HYDROCHLORIDE 1 MG/ML
INJECTION, SOLUTION INTRAMUSCULAR; INTRAVENOUS AS NEEDED
Status: DISCONTINUED | OUTPATIENT
Start: 2024-12-13 | End: 2024-12-13

## 2024-12-13 RX ORDER — FENTANYL CITRATE 50 UG/ML
INJECTION, SOLUTION INTRAMUSCULAR; INTRAVENOUS AS NEEDED
Status: DISCONTINUED | OUTPATIENT
Start: 2024-12-13 | End: 2024-12-13

## 2024-12-13 RX ORDER — LIDOCAINE HYDROCHLORIDE 10 MG/ML
0.1 INJECTION, SOLUTION EPIDURAL; INFILTRATION; INTRACAUDAL; PERINEURAL ONCE
Status: DISCONTINUED | OUTPATIENT
Start: 2024-12-13 | End: 2024-12-13 | Stop reason: HOSPADM

## 2024-12-13 RX ORDER — SODIUM CHLORIDE 0.9 % (FLUSH) 0.9 %
SYRINGE (ML) INJECTION CONTINUOUS PRN
Status: DISCONTINUED | OUTPATIENT
Start: 2024-12-13 | End: 2024-12-13

## 2024-12-13 RX ORDER — SODIUM CHLORIDE 0.9 G/100ML
IRRIGANT IRRIGATION AS NEEDED
Status: DISCONTINUED | OUTPATIENT
Start: 2024-12-13 | End: 2024-12-13 | Stop reason: HOSPADM

## 2024-12-13 RX ORDER — PROPOFOL 10 MG/ML
INJECTION, EMULSION INTRAVENOUS AS NEEDED
Status: DISCONTINUED | OUTPATIENT
Start: 2024-12-13 | End: 2024-12-13

## 2024-12-13 RX ORDER — OXYCODONE AND ACETAMINOPHEN 5; 325 MG/1; MG/1
1 TABLET ORAL EVERY 4 HOURS PRN
Status: DISCONTINUED | OUTPATIENT
Start: 2024-12-13 | End: 2024-12-13 | Stop reason: HOSPADM

## 2024-12-13 RX ORDER — METOCLOPRAMIDE HYDROCHLORIDE 5 MG/ML
10 INJECTION INTRAMUSCULAR; INTRAVENOUS ONCE AS NEEDED
Status: DISCONTINUED | OUTPATIENT
Start: 2024-12-13 | End: 2024-12-13 | Stop reason: HOSPADM

## 2024-12-13 RX ORDER — PROPOFOL 10 MG/ML
INJECTION, EMULSION INTRAVENOUS CONTINUOUS PRN
Status: DISCONTINUED | OUTPATIENT
Start: 2024-12-13 | End: 2024-12-13

## 2024-12-13 RX ORDER — DEXMEDETOMIDINE IN 0.9 % NACL 20 MCG/5ML
SYRINGE (ML) INTRAVENOUS AS NEEDED
Status: DISCONTINUED | OUTPATIENT
Start: 2024-12-13 | End: 2024-12-13

## 2024-12-13 RX ORDER — OXYCODONE HYDROCHLORIDE 5 MG/1
5 TABLET ORAL EVERY 6 HOURS PRN
Qty: 6 TABLET | Refills: 0 | Status: SHIPPED | OUTPATIENT
Start: 2024-12-13

## 2024-12-13 RX ORDER — MIDAZOLAM HYDROCHLORIDE 1 MG/ML
1 INJECTION, SOLUTION INTRAMUSCULAR; INTRAVENOUS ONCE AS NEEDED
Status: DISCONTINUED | OUTPATIENT
Start: 2024-12-13 | End: 2024-12-13 | Stop reason: HOSPADM

## 2024-12-13 ASSESSMENT — PAIN - FUNCTIONAL ASSESSMENT
PAIN_FUNCTIONAL_ASSESSMENT: 0-10
PAIN_FUNCTIONAL_ASSESSMENT: WONG-BAKER FACES

## 2024-12-13 ASSESSMENT — PAIN SCALES - GENERAL
PAINLEVEL_OUTOF10: 0 - NO PAIN
PAINLEVEL_OUTOF10: 0 - NO PAIN
PAINLEVEL_OUTOF10: 3
PAIN_LEVEL: 0
PAINLEVEL_OUTOF10: 0 - NO PAIN

## 2024-12-13 ASSESSMENT — COLUMBIA-SUICIDE SEVERITY RATING SCALE - C-SSRS
2. HAVE YOU ACTUALLY HAD ANY THOUGHTS OF KILLING YOURSELF?: NO
1. IN THE PAST MONTH, HAVE YOU WISHED YOU WERE DEAD OR WISHED YOU COULD GO TO SLEEP AND NOT WAKE UP?: NO
6. HAVE YOU EVER DONE ANYTHING, STARTED TO DO ANYTHING, OR PREPARED TO DO ANYTHING TO END YOUR LIFE?: NO

## 2024-12-13 NOTE — H&P
"History Of Present Illness  Failure conservative treatment for left carpal tunnel syndrome.  Also with superficial mass in thenar region that patient would like to proceed with excisional biopsy for.     Past Medical History  Past Medical History:   Diagnosis Date    Bleeding external hemorrhoids     Carpal tunnel syndrome of left wrist        Surgical History  Past Surgical History:   Procedure Laterality Date    HAND FUSION      HEMORRHOID SURGERY      NASAL/SINUS ENDOSCOPY      TESTICLE SURGERY          Social History  He reports that he has been smoking cigarettes. He has never used smokeless tobacco. He reports that he does not drink alcohol and does not use drugs.    Family History  No family history on file.     Allergies  Cinnamon and Codeine      ROS: Positive for numbness and tingling    Constitutional: Appears well-developed and well-nourished.  Head: Normocephalic and atraumatic.  Eyes: EOMI grossly  Cardiovascular: Intact distal pulses.   Respiratory: Effort normal. No respiratory distress.  Neurologic: Alert and oriented to person, place, and time.  Skin: Skin is warm and dry.  Hematologic / Lymphatic: No lymphedema, lymphangitis.  Psychiatric: normal mood and affect. Behavior is normal.   Musculoskeletal:  Left hand: Positive Durkan's.  Positive Tinel's at level of carpal tunnel.  Fullness near base of thenar mass.  No overlying skin changes.    Last Recorded Vitals  Blood pressure 159/90, pulse 56, temperature 36.5 °C (97.7 °F), temperature source Temporal, resp. rate 16, height 1.803 m (5' 11\"), weight 90.2 kg (198 lb 13.7 oz), SpO2 96%.         Assessment plan  Plan to proceed with left open carpal tunnel release as well as excisional biopsy of thenar mass.    "

## 2024-12-13 NOTE — ANESTHESIA POSTPROCEDURE EVALUATION
Patient: Jordan Singh    Procedure Summary       Date: 12/13/24 Room / Location: [facility] / Virtual [facility]    Anesthesia Start: 1154 Anesthesia Stop: 1241    Procedure: LEFT OPEN CARPAL TUNNEL RELEASE AND THENAR SKIN/MASS BIOPSY (Left: Hand) Diagnosis:       Carpal tunnel syndrome of left wrist      (Carpal tunnel syndrome of left wrist [G56.02])    Surgeons: Benitez Medina MD Responsible Provider: Akiko Andrews MD    Anesthesia Type: MAC ASA Status: 1            Anesthesia Type: MAC    Vitals Value Taken Time   /75 12/13/24 1249   Temp 36.5 °C (97.7 °F) 12/13/24 1238   Pulse 65 12/13/24 1249   Resp 20 12/13/24 1249   SpO2 96 % 12/13/24 1249       Anesthesia Post Evaluation    Patient location during evaluation: PACU  Patient participation: complete - patient participated  Level of consciousness: awake  Pain score: 0  Pain management: adequate  Airway patency: patent  Cardiovascular status: acceptable  Respiratory status: acceptable  Hydration status: acceptable  Postoperative Nausea and Vomiting: none        No notable events documented.

## 2024-12-13 NOTE — OP NOTE
Pre-Operative Diagnosis: left carpal tunnel syndrome and fullness about thenar eminence  Post-Operative Diagnosis: same  Procedure: left carpal tunnel release and left thumb thenar tissue biopsy  Surgeon: Carol  Assistant: Ricki  Anesthesia: Monitored anesthesia care with local  Complications: none  Estimated blood loss: minimal  Specimen: none  Findings: See below  Disposition: Good/PACU      Indications: Patient has failed conservative treatment for left carpal tunnel syndrome. After reviewing risks and benefits of continued nonoperative versus operative treatment they have decided to proceed with open carpal tunnel release.  Patient did have left hand MRI that did not demonstrate discrete mass, but still with some fullness palpable about the base of the thenar region.  Patient wished to proceed with excisional biopsy with the understanding that mass may recur or additional procedures may be indicated pending pathology review.  Patient understands that primary goal of surgery is to prevent further worsening of symptoms, but that there is no guarantee. Expected operative and postoperative courses reviewed and all questions addressed.    Operative course: Patient was greeted in the preoperative holding area and the operative sites were marked with indelible marker.  Patient was brought back to the operating room suite where anesthesia initiated monitored anesthesia care.  The left upper extremity was prepped and draped in standard sterile fashion and timeout procedure was performed as per standard protocol.  A mixture of lidocaine with epinephrine was injected in the region of the planned surgical sites.  Esmarch was used to exsanguinate the upper extremity and forearm tourniquet inflated.  Longitudinal incision made overlying level of transverse carpal ligament in line with the radial border of the ring finger. Gentle spreading was carried down through the palmar fascia and transverse carpal ligament was  identified and sharply released in a distal to proximal direction under direct visualization. Complete release proximally was confirmed visually as well as by palpation. Gentle spreading distally also confirmed complete release.  Median nerve was inspected and confirmed to be fully intact. Wound was then irrigated and reapproximated with 4-0 Monocryl suture in a buried interrupted fashion.     Attention was then taken to the area of the thenar fullness.  Incision was made directly overlying this area and gentle spreading was carried down through the subcutaneous tissues.  There was some prominent fascia in this area that appeared to be consistent in fullness with the palpable area on patient's exam.  While carefully protecting the underlying muscle and neurovascular structures this fascial tissue was sharply excised and passed off the field for pathology analysis.  After removal of this fascial tissue there was no further palpable fullness on gross examination.  Superficial bleeders were cauterized with bipolar electrocautery and wound was irrigated and reapproximated with 4-0 Monocryl.  Dermabond was applied to the incisions.  Dry sterile dressings were applied and patient was taken to the recovery room for further care.    Patient will follow-up in 2 weeks for clinical check.

## 2024-12-13 NOTE — DISCHARGE INSTRUCTIONS
Dr. Medina Post-Operative Instructions  Hand/Wrist/Elbow    Activity:  Rest on the day of surgery.  Gradually resume your regular diet, beginning with clear liquids and progressing as you feel ready.  No driving if you had anesthesia.    Anesthesia:  You may feel dizzy, sleepy or lightheaded for up to 24 hours after surgery.  If you had general anesthesia you may have a sore throat for 1-2 days.  If you had a nerve block wear a sling until nerve function returns for your safety.  Nerve block may last 18-36 hours.    Post-Operative Medications:  You may resume your regular medications (including blood thinners if you stopped them).  You have been given a prescription for pain medication as needed.  You may also take over-the-counter anti-inflammatories and/or Tylenol for pain relief.  If you are taking the prescribed pain medication you must limit additional Tylenol (acetaminophen) intake to avoid overdose.    Dressings:  Keep your dressings clean and dry.  You may cover with a plastic bag or cast cover and seal bag to your skin above the bandage for showering.  If your dressing becomes wet or significantly bloodstained call the office at [phone].  Okay to remove outer dressings after 2-3 days and shower/wash hands.  Do not soak wound (I.e. no swimming pool, hot tub or dishes).    Post-Operative Care:  Keep the surgical site elevated above the level of your heart to limit swelling.  If your fingers are not included in the dressing you are encouraged to move your fingers regularly (full fist and full extension).  Regularly ice the surgical site.  You may also apply ice pack above the level of the dressing and this will cool the blood as it travels towards the surgical site.    Call Surgeon/Office at any time for:           Office Number: [phone]  Excessive bleeding  Loss of feeling (The local numbing medicine from surgery typically lasts 8-12 hours.  Nerve blocks can last 18-36 hours.)  Tight  dressing: Make sure that you are elevating the operative site appropriately.  If no relief then call the office.                                                                                                        Circulation issues:  If fingers change to white or blue  Concerns/Problems with your surgery

## 2024-12-13 NOTE — ANESTHESIA PREPROCEDURE EVALUATION
Patient: Jordan Singh    Procedure Information       Anesthesia Start Date/Time: 12/13/24 1154    Procedure: LEFT OPEN CARPAL TUNNEL RELEASE AND THENAR SKIN/MASS BIOPSY (Left: Hand)    Location: [facility] / [facility]    Surgeons: Benitez Medina MD            Relevant Problems   Neuro   (+) Carpal tunnel syndrome of left wrist      GI   (+) Bleeding external hemorrhoids      Musculoskeletal   (+) Carpal tunnel syndrome of left wrist       Clinical information reviewed:   Tobacco  Allergies  Meds   Med Hx  Surg Hx   Fam Hx  Soc Hx        NPO Detail:  NPO/Void Status  Date of Last Liquid: 12/12/24  Time of Last Liquid: 2100  Date of Last Solid: 12/12/24  Time of Last Solid: 2100  Last Intake Type: Food  Time of Last Void: 0800         Physical Exam    Airway  Mallampati: I  TM distance: >3 FB  Neck ROM: full     Cardiovascular - normal exam     Dental - normal exam     Pulmonary - normal exam     Abdominal - normal exam             Anesthesia Plan    History of general anesthesia?: yes  History of complications of general anesthesia?: no    ASA 1     MAC     intravenous induction   Anesthetic plan and risks discussed with patient.    Plan discussed with CRNA.

## 2024-12-27 ENCOUNTER — ANESTHESIA EVENT (OUTPATIENT)
Dept: GASTROENTEROLOGY | Facility: HOSPITAL | Age: 54
End: 2024-12-27
Payer: COMMERCIAL

## 2024-12-27 ENCOUNTER — ANESTHESIA (OUTPATIENT)
Dept: GASTROENTEROLOGY | Facility: HOSPITAL | Age: 54
End: 2024-12-27
Payer: COMMERCIAL

## 2024-12-27 ENCOUNTER — HOSPITAL ENCOUNTER (OUTPATIENT)
Dept: GASTROENTEROLOGY | Facility: HOSPITAL | Age: 54
Discharge: HOME | End: 2024-12-27
Payer: COMMERCIAL

## 2024-12-27 VITALS
TEMPERATURE: 96.8 F | SYSTOLIC BLOOD PRESSURE: 130 MMHG | RESPIRATION RATE: 16 BRPM | WEIGHT: 198.85 LBS | OXYGEN SATURATION: 100 % | HEART RATE: 50 BPM | DIASTOLIC BLOOD PRESSURE: 79 MMHG | BODY MASS INDEX: 27.73 KG/M2

## 2024-12-27 DIAGNOSIS — Z12.11 SCREEN FOR COLON CANCER: ICD-10-CM

## 2024-12-27 PROCEDURE — 7100000010 HC PHASE TWO TIME - EACH INCREMENTAL 1 MINUTE

## 2024-12-27 PROCEDURE — 45378 DIAGNOSTIC COLONOSCOPY: CPT | Performed by: SURGERY

## 2024-12-27 PROCEDURE — 3700000002 HC GENERAL ANESTHESIA TIME - EACH INCREMENTAL 1 MINUTE

## 2024-12-27 PROCEDURE — 7100000002 HC RECOVERY ROOM TIME - EACH INCREMENTAL 1 MINUTE

## 2024-12-27 PROCEDURE — 3700000001 HC GENERAL ANESTHESIA TIME - INITIAL BASE CHARGE

## 2024-12-27 PROCEDURE — 2500000004 HC RX 250 GENERAL PHARMACY W/ HCPCS (ALT 636 FOR OP/ED)

## 2024-12-27 PROCEDURE — 7100000001 HC RECOVERY ROOM TIME - INITIAL BASE CHARGE

## 2024-12-27 PROCEDURE — 7100000009 HC PHASE TWO TIME - INITIAL BASE CHARGE

## 2024-12-27 RX ORDER — HYDRALAZINE HYDROCHLORIDE 20 MG/ML
5 INJECTION INTRAMUSCULAR; INTRAVENOUS EVERY 30 MIN PRN
Status: DISCONTINUED | OUTPATIENT
Start: 2024-12-27 | End: 2024-12-28 | Stop reason: HOSPADM

## 2024-12-27 RX ORDER — LIDOCAINE HYDROCHLORIDE 10 MG/ML
0.1 INJECTION, SOLUTION INFILTRATION; PERINEURAL ONCE
Status: DISCONTINUED | OUTPATIENT
Start: 2024-12-27 | End: 2024-12-28 | Stop reason: HOSPADM

## 2024-12-27 RX ORDER — ONDANSETRON HYDROCHLORIDE 2 MG/ML
4 INJECTION, SOLUTION INTRAVENOUS ONCE AS NEEDED
Status: DISCONTINUED | OUTPATIENT
Start: 2024-12-27 | End: 2024-12-28 | Stop reason: HOSPADM

## 2024-12-27 RX ORDER — FENTANYL CITRATE 50 UG/ML
50 INJECTION, SOLUTION INTRAMUSCULAR; INTRAVENOUS EVERY 5 MIN PRN
Status: DISCONTINUED | OUTPATIENT
Start: 2024-12-27 | End: 2024-12-28 | Stop reason: HOSPADM

## 2024-12-27 RX ORDER — ALBUTEROL SULFATE 0.83 MG/ML
2.5 SOLUTION RESPIRATORY (INHALATION) ONCE AS NEEDED
Status: DISCONTINUED | OUTPATIENT
Start: 2024-12-27 | End: 2024-12-28 | Stop reason: HOSPADM

## 2024-12-27 RX ORDER — PROPOFOL 10 MG/ML
INJECTION, EMULSION INTRAVENOUS CONTINUOUS PRN
Status: DISCONTINUED | OUTPATIENT
Start: 2024-12-27 | End: 2024-12-27

## 2024-12-27 RX ORDER — MIDAZOLAM HYDROCHLORIDE 1 MG/ML
1 INJECTION, SOLUTION INTRAMUSCULAR; INTRAVENOUS ONCE AS NEEDED
Status: DISCONTINUED | OUTPATIENT
Start: 2024-12-27 | End: 2024-12-28 | Stop reason: HOSPADM

## 2024-12-27 RX ORDER — LIDOCAINE HYDROCHLORIDE 10 MG/ML
INJECTION, SOLUTION INFILTRATION; PERINEURAL AS NEEDED
Status: DISCONTINUED | OUTPATIENT
Start: 2024-12-27 | End: 2024-12-27

## 2024-12-27 RX ORDER — SODIUM CHLORIDE, SODIUM LACTATE, POTASSIUM CHLORIDE, CALCIUM CHLORIDE 600; 310; 30; 20 MG/100ML; MG/100ML; MG/100ML; MG/100ML
100 INJECTION, SOLUTION INTRAVENOUS CONTINUOUS
Status: DISCONTINUED | OUTPATIENT
Start: 2024-12-27 | End: 2024-12-28 | Stop reason: HOSPADM

## 2024-12-27 SDOH — HEALTH STABILITY: MENTAL HEALTH: CURRENT SMOKER: 0

## 2024-12-27 ASSESSMENT — PAIN SCALES - GENERAL
PAINLEVEL_OUTOF10: 0 - NO PAIN
PAINLEVEL_OUTOF10: 0 - NO PAIN
PAIN_LEVEL: 2
PAINLEVEL_OUTOF10: 0 - NO PAIN

## 2024-12-27 ASSESSMENT — PAIN - FUNCTIONAL ASSESSMENT
PAIN_FUNCTIONAL_ASSESSMENT: 0-10

## 2024-12-27 NOTE — DISCHARGE INSTRUCTIONS
Instructions  Patient Instructions after a Colonoscopy, Upper GI, and ERCP      The anesthetics, sedatives or narcotics which were given to you today will be acting in your body for the next 24 hours, so you might feel a little sleepy or groggy.  This feeling should slowly wear off. Carefully read and follow the instructions.      You received sedation today:  - Do not drive or operate any machinery or power tools of any kind.   - No alcoholic beverages today, not even beer or wine.  - Do not make any important decisions or sign any legal documents.  - No over the counter medications that contain alcohol or that may cause drowsiness.  - Do not make any important decisions or sign any legal documents.     While it is common to experience mild to moderate abdominal distention, gas, or belching after your procedure, if any of these symptoms occur following discharge from the GI Lab or within one week of having your procedure, call the [facility] to be advised whether a visit to your nearest Urgent Care or Emergency Department is indicated.  Take this paper with you if you go.      - If you develop an allergic reaction to the medications that were given during your procedure such as difficulty breathing, rash, hives, severe nausea, vomiting or lightheadedness.- If you experience chest pain, shortness of breath, severe abdominal pain, fevers and chills.     -If you develop signs and symptoms of bleeding such as blood in your spit, if your stools turn black, tarry, or bloody     - If you have not urinated within 8 hours following your procedure.- If your IV site becomes painful, red, inflamed, or looks infected.     If you received a biopsy/polypectomy/sphincterotomy the following instructions apply below:     - Do not use Aspirin containing products, non-steroidal medications or anti-coagulants for one week following your procedure. (Examples of these types of medications are: Advil, Arthrotec, Aleve,  Coumadin, Ecotrin, Heparin, Ibuprofen, Indocin, Motrin, Naprosyn, Nuprin, Plavix, Vioxx, and Voltarin, or their generic forms.  This list is not all-inclusive.  Check with your physician or pharmacist before resuming medications.)      - Eat a soft diet today.  Avoid foods that are poorly digested for the next 24 hours.  These foods would include: nuts, beans, lettuce, red meats, and fried foods.       - Start with liquids and advance your diet as tolerated, gradually work up to eating solids.      - Do not have a Barium Study or Enema for one week.     Your physician recommends the additional following instructions:     Colonoscopy: Resume your previous diet, continue your present medications, a repeated colonoscopy will be determined based on pathology result. Return to normal activity tomorrow.      Upper GI endoscopy: Resume your previous diet, continue your medications, recommendation to repeat upper endoscopy in three months for follow up of Curiel's ablation. Return to normal activity tomorrow.      ERCP: Recommended a repeat ERCP in eight weeks to exchange a stent. Watch for symptoms of pancreatitis, bleeding, perforation and cholangitis.  Please see Medication Reconciliation Form for new medication/medications prescribed.     If you experience any problems or have any questions following discharge from the GI Lab, please call:  Before 5p.m.  [phone]  After 5p.m.    [phone]

## 2024-12-27 NOTE — PERIOPERATIVE NURSING NOTE
0755: Pt. To PACU [place] [facility], report received, anesthesia at bedside, no s/s of pain/respiratory distress, VSS.

## 2024-12-27 NOTE — POST-PROCEDURE NOTE
Patient alert and oriented. No c/o nausea or pain. Tolerating oral fluids and cookies. Son here to drive home. Patient ready for DC

## 2024-12-27 NOTE — ANESTHESIA POSTPROCEDURE EVALUATION
Patient: Jordan Singh    Procedure Summary       Date: 12/27/24 Room / Location: [facility]    Anesthesia Start: 0725 Anesthesia Stop: 0800    Procedure: COLONOSCOPY Diagnosis: Screen for colon cancer    Scheduled Providers: Jordon JIMENES MD; Saman Sage MD Responsible Provider: Saman Sage MD    Anesthesia Type: MAC ASA Status: 2            Anesthesia Type: MAC    Vitals Value Taken Time   /79 12/27/24 0810   Temp 35.8 °C (96.4 °F) 12/27/24 0755   Pulse 53 12/27/24 0810   Resp 15 12/27/24 0810   SpO2 98 % 12/27/24 0817       Anesthesia Post Evaluation    Patient location during evaluation: PACU  Patient participation: complete - patient participated  Level of consciousness: sleepy but conscious  Pain score: 2  Pain management: adequate  Multimodal analgesia pain management approach  Airway patency: patent  Cardiovascular status: acceptable  Respiratory status: acceptable  Hydration status: acceptable  Postoperative Nausea and Vomiting: none        There were no known notable events for this encounter.

## 2024-12-27 NOTE — ANESTHESIA PREPROCEDURE EVALUATION
Patient: Jordan Singh    Procedure Information       Anesthesia Start Date/Time: 12/27/24 0732    Scheduled providers: Jordon JIMENES MD; Saman Sage MD    Procedure: COLONOSCOPY    Location: [facility]            Relevant Problems   Neuro   (+) Carpal tunnel syndrome of left wrist      GI   (+) Bleeding external hemorrhoids      Musculoskeletal   (+) Carpal tunnel syndrome of left wrist       Clinical information reviewed:   Tobacco  Allergies  Meds   Med Hx  Surg Hx   Fam Hx  Soc Hx        NPO Detail:  NPO/Void Status  Carbohydrate Drink Given Prior to Surgery? : N  Date of Last Liquid: 12/26/24  Time of Last Liquid: 2330  Date of Last Solid: 12/25/24  Time of Last Solid: 1730  Last Intake Type: GI prep  Time of Last Void: 0500         Physical Exam    Airway  Mallampati: II  TM distance: >3 FB  Neck ROM: full     Cardiovascular - normal exam     Dental - normal exam     Pulmonary - normal exam     Abdominal - normal exam             Anesthesia Plan    History of general anesthesia?: yes  History of complications of general anesthesia?: no    ASA 2     MAC     The patient is not a current smoker.  Patient was not previously instructed to abstain from smoking on day of procedure.  Patient did not smoke on day of procedure.  Education provided regarding risk of obstructive sleep apnea.  intravenous induction   Postoperative administration of opioids is intended.  Trial extubation is planned.  Anesthetic plan and risks discussed with patient.  Use of blood products discussed with patient who consented to blood products.    Plan discussed with CAA, attending and CRNA.

## 2025-01-06 ENCOUNTER — APPOINTMENT (OUTPATIENT)
Dept: ORTHOPEDIC SURGERY | Facility: CLINIC | Age: 55
End: 2025-01-06
Payer: COMMERCIAL

## 2025-02-24 ENCOUNTER — OFFICE VISIT (OUTPATIENT)
Dept: PRIMARY CARE | Facility: CLINIC | Age: 55
End: 2025-02-24
Payer: COMMERCIAL

## 2025-02-24 VITALS
SYSTOLIC BLOOD PRESSURE: 132 MMHG | WEIGHT: 205.2 LBS | BODY MASS INDEX: 28.73 KG/M2 | HEART RATE: 71 BPM | RESPIRATION RATE: 18 BRPM | DIASTOLIC BLOOD PRESSURE: 72 MMHG | HEIGHT: 71 IN | OXYGEN SATURATION: 97 % | TEMPERATURE: 97.5 F

## 2025-02-24 DIAGNOSIS — S23.9XXA THORACIC SPRAIN: Primary | ICD-10-CM

## 2025-02-24 DIAGNOSIS — R42 DIZZINESS: ICD-10-CM

## 2025-02-24 PROBLEM — M51.379 DDD (DEGENERATIVE DISC DISEASE), LUMBOSACRAL: Status: ACTIVE | Noted: 2025-02-24

## 2025-02-24 PROBLEM — M51.369 BULGING OF INTERVERTEBRAL DISC BETWEEN L4 AND L5: Status: ACTIVE | Noted: 2025-02-24

## 2025-02-24 PROCEDURE — 3008F BODY MASS INDEX DOCD: CPT | Performed by: FAMILY MEDICINE

## 2025-02-24 PROCEDURE — 99203 OFFICE O/P NEW LOW 30 MIN: CPT | Performed by: FAMILY MEDICINE

## 2025-02-24 PROCEDURE — 4004F PT TOBACCO SCREEN RCVD TLK: CPT | Performed by: FAMILY MEDICINE

## 2025-02-24 RX ORDER — CYCLOBENZAPRINE HCL 10 MG
10 TABLET ORAL NIGHTLY PRN
Qty: 30 TABLET | Refills: 0 | Status: SHIPPED | OUTPATIENT
Start: 2025-02-24 | End: 2025-04-25

## 2025-02-24 RX ORDER — PREDNISONE 20 MG/1
TABLET ORAL
Qty: 20 TABLET | Refills: 0 | Status: SHIPPED | OUTPATIENT
Start: 2025-02-24 | End: 2025-03-09

## 2025-02-24 ASSESSMENT — PATIENT HEALTH QUESTIONNAIRE - PHQ9
1. LITTLE INTEREST OR PLEASURE IN DOING THINGS: NOT AT ALL
SUM OF ALL RESPONSES TO PHQ9 QUESTIONS 1 AND 2: 0
2. FEELING DOWN, DEPRESSED OR HOPELESS: NOT AT ALL

## 2025-02-24 ASSESSMENT — PAIN SCALES - GENERAL: PAINLEVEL_OUTOF10: 4

## 2025-02-24 NOTE — PROGRESS NOTES
"Subjective   Patient ID: Jordan Singh is a 54 y.o. male who presents for Back Pain and Dizziness.    HPI pt c/o being lightheaded for hours at a time he states this started 6 months ago after having testicle removed.    Review of Systems    Objective   /72   Pulse 71   Temp 36.4 °C (97.5 °F)   Resp 18   Ht 1.803 m (5' 11\")   Wt 93.1 kg (205 lb 3.2 oz)   SpO2 97%   BMI 28.62 kg/m²     Physical Exam  Constitutional:       General: He is not in acute distress.     Appearance: Normal appearance.   Cardiovascular:      Rate and Rhythm: Normal rate and regular rhythm.      Heart sounds: No murmur heard.  Pulmonary:      Breath sounds: Normal breath sounds. No wheezing.   Neurological:      Mental Status: He is alert.     Thoracic :  psm spasm and ttp rt t2-t8.      Assessment/Plan   Problem List Items Addressed This Visit    None  Visit Diagnoses         Codes    Thoracic sprain    -  Primary S23.9XXA    Relevant Medications    predniSONE (Deltasone) 20 mg tablet    cyclobenzaprine (Flexeril) 10 mg tablet    Dizziness     R42               "

## 2025-02-25 LAB
ALBUMIN SERPL-MCNC: 4.7 G/DL (ref 3.6–5.1)
ALP SERPL-CCNC: 79 U/L (ref 35–144)
ALT SERPL-CCNC: 25 U/L (ref 9–46)
ANION GAP SERPL CALCULATED.4IONS-SCNC: 11 MMOL/L (CALC) (ref 7–17)
AST SERPL-CCNC: 20 U/L (ref 10–35)
BILIRUB SERPL-MCNC: 0.3 MG/DL (ref 0.2–1.2)
BUN SERPL-MCNC: 13 MG/DL (ref 7–25)
CALCIUM SERPL-MCNC: 9.8 MG/DL (ref 8.6–10.3)
CHLORIDE SERPL-SCNC: 106 MMOL/L (ref 98–110)
CO2 SERPL-SCNC: 24 MMOL/L (ref 20–32)
CREAT SERPL-MCNC: 0.97 MG/DL (ref 0.7–1.3)
EGFRCR SERPLBLD CKD-EPI 2021: 94 ML/MIN/1.73M2
GLUCOSE SERPL-MCNC: 116 MG/DL (ref 65–99)
POTASSIUM SERPL-SCNC: 4.4 MMOL/L (ref 3.5–5.3)
PROT SERPL-MCNC: 7.1 G/DL (ref 6.1–8.1)
SODIUM SERPL-SCNC: 141 MMOL/L (ref 135–146)
TESTOST FREE SERPL-MCNC: NORMAL PG/ML
TESTOST SERPL-MCNC: NORMAL NG/DL

## 2025-03-03 DIAGNOSIS — Z12.5 SCREENING PSA (PROSTATE SPECIFIC ANTIGEN): ICD-10-CM

## 2025-03-03 DIAGNOSIS — R73.09 ELEVATED GLUCOSE: ICD-10-CM

## 2025-03-03 LAB
ALBUMIN SERPL-MCNC: 4.7 G/DL (ref 3.6–5.1)
ALP SERPL-CCNC: 79 U/L (ref 35–144)
ALT SERPL-CCNC: 25 U/L (ref 9–46)
ANION GAP SERPL CALCULATED.4IONS-SCNC: 11 MMOL/L (CALC) (ref 7–17)
AST SERPL-CCNC: 20 U/L (ref 10–35)
BILIRUB SERPL-MCNC: 0.3 MG/DL (ref 0.2–1.2)
BUN SERPL-MCNC: 13 MG/DL (ref 7–25)
CALCIUM SERPL-MCNC: 9.8 MG/DL (ref 8.6–10.3)
CHLORIDE SERPL-SCNC: 106 MMOL/L (ref 98–110)
CO2 SERPL-SCNC: 24 MMOL/L (ref 20–32)
CREAT SERPL-MCNC: 0.97 MG/DL (ref 0.7–1.3)
EGFRCR SERPLBLD CKD-EPI 2021: 94 ML/MIN/1.73M2
GLUCOSE SERPL-MCNC: 116 MG/DL (ref 65–99)
POTASSIUM SERPL-SCNC: 4.4 MMOL/L (ref 3.5–5.3)
PROT SERPL-MCNC: 7.1 G/DL (ref 6.1–8.1)
SODIUM SERPL-SCNC: 141 MMOL/L (ref 135–146)
TESTOST FREE SERPL-MCNC: 54.9 PG/ML (ref 35–155)
TESTOST SERPL-MCNC: 454 NG/DL (ref 250–1100)

## 2025-03-09 ENCOUNTER — PATIENT MESSAGE (OUTPATIENT)
Dept: PRIMARY CARE | Facility: CLINIC | Age: 55
End: 2025-03-09
Payer: COMMERCIAL

## 2025-03-10 DIAGNOSIS — M51.379 DEGENERATION OF INTERVERTEBRAL DISC OF LUMBOSACRAL REGION, UNSPECIFIED WHETHER PAIN PRESENT: Primary | ICD-10-CM

## 2025-03-10 RX ORDER — MELOXICAM 15 MG/1
15 TABLET ORAL DAILY
Qty: 30 TABLET | Refills: 11 | Status: SHIPPED | OUTPATIENT
Start: 2025-03-10 | End: 2026-03-10

## 2025-03-19 ENCOUNTER — HOSPITAL ENCOUNTER (EMERGENCY)
Facility: HOSPITAL | Age: 55
Discharge: HOME | End: 2025-03-19
Attending: STUDENT IN AN ORGANIZED HEALTH CARE EDUCATION/TRAINING PROGRAM
Payer: COMMERCIAL

## 2025-03-19 VITALS
RESPIRATION RATE: 16 BRPM | BODY MASS INDEX: 28.28 KG/M2 | OXYGEN SATURATION: 96 % | HEIGHT: 71 IN | WEIGHT: 202 LBS | SYSTOLIC BLOOD PRESSURE: 151 MMHG | HEART RATE: 61 BPM | TEMPERATURE: 97.9 F | DIASTOLIC BLOOD PRESSURE: 90 MMHG

## 2025-03-19 DIAGNOSIS — H01.009 ACUTE BLEPHARITIS: ICD-10-CM

## 2025-03-19 DIAGNOSIS — L03.213 PRESEPTAL CELLULITIS OF RIGHT EYE: Primary | ICD-10-CM

## 2025-03-19 PROCEDURE — 2500000001 HC RX 250 WO HCPCS SELF ADMINISTERED DRUGS (ALT 637 FOR MEDICARE OP): Performed by: STUDENT IN AN ORGANIZED HEALTH CARE EDUCATION/TRAINING PROGRAM

## 2025-03-19 PROCEDURE — 99283 EMERGENCY DEPT VISIT LOW MDM: CPT | Performed by: STUDENT IN AN ORGANIZED HEALTH CARE EDUCATION/TRAINING PROGRAM

## 2025-03-19 PROCEDURE — 2500000005 HC RX 250 GENERAL PHARMACY W/O HCPCS: Performed by: STUDENT IN AN ORGANIZED HEALTH CARE EDUCATION/TRAINING PROGRAM

## 2025-03-19 RX ORDER — TETRACAINE HYDROCHLORIDE 5 MG/ML
2 SOLUTION OPHTHALMIC ONCE
Status: COMPLETED | OUTPATIENT
Start: 2025-03-19 | End: 2025-03-19

## 2025-03-19 RX ORDER — AMOXICILLIN AND CLAVULANATE POTASSIUM 875; 125 MG/1; MG/1
1 TABLET, FILM COATED ORAL ONCE
Status: COMPLETED | OUTPATIENT
Start: 2025-03-19 | End: 2025-03-19

## 2025-03-19 RX ORDER — CEPHALEXIN 500 MG/1
500 CAPSULE ORAL ONCE
Status: DISCONTINUED | OUTPATIENT
Start: 2025-03-19 | End: 2025-03-19

## 2025-03-19 RX ORDER — IBUPROFEN 800 MG/1
800 TABLET ORAL ONCE
Status: COMPLETED | OUTPATIENT
Start: 2025-03-19 | End: 2025-03-19

## 2025-03-19 RX ORDER — AMOXICILLIN AND CLAVULANATE POTASSIUM 875; 125 MG/1; MG/1
1 TABLET, FILM COATED ORAL EVERY 12 HOURS
Qty: 10 TABLET | Refills: 0 | Status: SHIPPED | OUTPATIENT
Start: 2025-03-19 | End: 2025-03-24

## 2025-03-19 RX ADMIN — IBUPROFEN 800 MG: 800 TABLET, FILM COATED ORAL at 06:39

## 2025-03-19 RX ADMIN — FLUORESCEIN SODIUM 1 STRIP: 1 STRIP OPHTHALMIC at 06:54

## 2025-03-19 RX ADMIN — TETRACAINE HYDROCHLORIDE 2 DROP: 5 SOLUTION OPHTHALMIC at 06:55

## 2025-03-19 RX ADMIN — AMOXICILLIN AND CLAVULANATE POTASSIUM 1 TABLET: 875; 125 TABLET, COATED ORAL at 06:59

## 2025-03-19 ASSESSMENT — PAIN - FUNCTIONAL ASSESSMENT: PAIN_FUNCTIONAL_ASSESSMENT: 0-10

## 2025-03-19 ASSESSMENT — PAIN SCALES - GENERAL: PAINLEVEL_OUTOF10: 0 - NO PAIN

## 2025-03-19 NOTE — DISCHARGE INSTRUCTIONS
Thank you for allowing myself and the team to take care of you during your emergency situation and addressing your health concerns.    You were evaluated for eye pain/symptoms.     Your likely condition/diagnosis: Preseptal cellulitis/skin infection around the eye    During your visit in the emergency department you were evaluated for your presenting symptoms.  Based on the extensive workup you received,  all efforts were made to identify the source of your symptoms and identify any life-threatening conditions.  These eye/vision and potentially life-threatening conditions that were attempted to be identified medically managed/treated include but not limited to surrounding eye/orbit infection, acute angle closure glaucoma, traumatic eye injury, corneal (anterior part of eye) abrasion/ulceration, retinal or vitreous (inner eye) detachment, retinal artery or vein clot. In addition to, there are non-vision threatening conditions that were attempted to be identified include but not limited to localized/pre-septal skin infection, chalazion (clogged/infected eye lid gland), or hordeolum (stye), optic neuritis (nerve inflammation). Additionally you may be experiencing symptoms that are non-life-threatening but are uncomfortable and disruptive to your everyday life.  All efforts were made to manage your symptoms while in the emergency department along with appropriate at home therapy for symptomatic management during your recovery. vPlease be aware that not all of the conditions explained/discussed in these discharge instructions are applicable to your condition but encompass many of the conditions that were evaluated for during your ED encounter.      During your evaluation and assessment, all measures were taken to evaluate you and address your health concerns to identify dangerous and life threatening health conditions. It is not possible to identify all health conditions or pathologies and eliminate any chance of  unfavorable outcomes while in the Emergency Department. My team encourages you to be vigilant with your health and follow-up with the appropriate providers outlined in your discharge paperwork. Please return to the Emergency Department if you feel that your health has not improved or experiencing worsening symptoms.    Special instructions please take the medications as prescribed.  Please make follow-up with your primary care physician to further manage symptoms address your health concerns.    Incidental findings:  None

## 2025-03-19 NOTE — ED PROVIDER NOTES
"HPI   Chief Complaint   Patient presents with    Eye Problem     Pt c/o swelling and redness around both eyes.  Pt states he took benadryl last night before bed and when he woke up this AM his eyes look worse.         Patient presents to the ED for right periorbital swelling for the last 2 to 3 days.  Notes symptoms started on Monday and does not recall any specific injuries to his eyeball.  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  Does not appreciate any ocular pain with movements, conjunctival injection, or significant drainage but states he has experienced some crusting when he wakes up.  Denies any actual injuries to the periorbital region.  Notes no left-sided ocular symptoms.  Denies any appreciable fever/chills.  Denies any rhinorrhea.  Denies any other significant systemic symptoms or complaints.              Patient History   Past Medical History:   Diagnosis Date    Bleeding external hemorrhoids     Carpal tunnel syndrome of left wrist      Past Surgical History:   Procedure Laterality Date    HAND FUSION      HEMORRHOID SURGERY      NASAL/SINUS ENDOSCOPY      TESTICLE SURGERY       No family history on file.  Social History     Tobacco Use    Smoking status: Former     Types: Cigarettes    Smokeless tobacco: Never   Vaping Use    Vaping status: Never Used   Substance Use Topics    Alcohol use: Never    Drug use: Never       Physical Exam   /90   Pulse 61   Temp 36.6 °C (97.9 °F) (Temporal)   Resp 16   Ht 1.803 m (5' 11\")   Wt 91.6 kg (202 lb)   SpO2 96%   BMI 28.17 kg/m²       Physical Exam  Vitals and nursing note reviewed.   Constitutional:       General: He is not in acute distress.     Appearance: Normal appearance. He is normal weight. He is not ill-appearing.   HENT:      Nose: Nose normal.      Mouth/Throat:      Mouth: Mucous membranes are moist.      Pharynx: Oropharynx is clear.   Eyes:      General: No scleral icterus.        Right eye: No foreign body, discharge or hordeolum.      " Extraocular Movements:      Right eye: Normal extraocular motion.      Conjunctiva/sclera:      Right eye: Right conjunctiva is not injected. No chemosis, exudate or hemorrhage.     Pupils: Pupils are equal, round, and reactive to light.      Comments: Periorbital erythema and swelling, no appreciable ecchymosis, periorbital rims intact, no conjunctival injection and no appreciable fluorescein uptake consistent with corneal abrasion/ulceration, no appreciable FB under upper/lower eyelids, full EOM without limitations, no evidence of hordeolum or chalazion, no evidence of hyphema or hypopyon   Cardiovascular:      Rate and Rhythm: Normal rate.   Pulmonary:      Effort: Pulmonary effort is normal.   Skin:     General: Skin is warm and dry.   Neurological:      General: No focal deficit present.      Mental Status: He is alert and oriented to person, place, and time.           ED Course & MDM   ED Course as of 03/19/25 0654   Wed Mar 19, 2025   0629 VSS on presentation in setting of reported ocular pain/symptoms [BC]      ED Course User Index  [BC] Ben Del Rio MD         Diagnoses as of 03/19/25 0654   Preseptal cellulitis of right eye   Acute blepharitis                 No data recorded     Devin Coma Scale Score: 15 (03/19/25 0639 : Tosin Avila RN)                           Medical Decision Making  Patient presented to the ED for right periorbital swelling and redness with concerning PMHx of nothing pertinent.  I personally reviewed and interpreted VS which are as stated above in the ED course.    Assessment/evaluation consistent with periorbital/preseptal cellulitis and mild blepharitis.  No concerning history, clinical evidence/work-up, or exam findings for the considered differentials of orbital cellulitis, corneal abrasion/ulceration, globe injury, conjunctival FB, viral/bacterial conjunctivitis.  These conditions have been thoroughly evaluated and determined to be sufficiently unlikely to be the  etiology of patient's presenting symptoms.    Prescribed Augmentin for appropriate treatment.  After receiving an appropriate exam, clinical work-up, and necessary interventions/treatment, Patient is appropriate for discharge at this time due to no concerning symptoms or findings requiring hospitalization for stabilization or further interrogation/management and is appropriate for management of symptoms at home with recommended appropriate outpatient follow-up.  Patient was encouraged to ask any questions or for clarification of today's ED encounter.  Patient is agreeable to plan of care. Discussed with Patient today's results/findings and likely diagnosis, provided appropriate RTED precautions along with recommended follow-up with PCP.      Per Chart Review: Nothing pertinent to this ED encounter.      Parts of this chart have been completed using voice-to-text recognition software. Please excuse any errors of transcription that were missed for editing/correcting.        Procedure  Procedures     Ben Del Rio MD  03/19/25 0652

## 2025-04-16 ENCOUNTER — APPOINTMENT (OUTPATIENT)
Dept: UROLOGY | Facility: CLINIC | Age: 55
End: 2025-04-16
Payer: COMMERCIAL

## 2025-04-16 DIAGNOSIS — Z12.5 ENCOUNTER FOR SCREENING FOR MALIGNANT NEOPLASM OF PROSTATE: ICD-10-CM

## 2025-04-16 DIAGNOSIS — R39.12 BENIGN PROSTATIC HYPERPLASIA WITH WEAK URINARY STREAM: Primary | ICD-10-CM

## 2025-04-16 DIAGNOSIS — N40.1 BENIGN PROSTATIC HYPERPLASIA WITH WEAK URINARY STREAM: Primary | ICD-10-CM

## 2025-04-16 LAB
POC APPEARANCE, URINE: CLEAR
POC BILIRUBIN, URINE: NEGATIVE
POC BLOOD, URINE: NEGATIVE
POC COLOR, URINE: YELLOW
POC GLUCOSE, URINE: NEGATIVE MG/DL
POC KETONES, URINE: NEGATIVE MG/DL
POC LEUKOCYTES, URINE: NEGATIVE
POC NITRITE,URINE: NEGATIVE
POC PH, URINE: 5.5 PH
POC PROTEIN, URINE: NEGATIVE MG/DL
POC SPECIFIC GRAVITY, URINE: >=1.03
POC UROBILINOGEN, URINE: 0.2 EU/DL

## 2025-04-16 PROCEDURE — 81003 URINALYSIS AUTO W/O SCOPE: CPT | Performed by: SURGERY

## 2025-04-16 PROCEDURE — 4004F PT TOBACCO SCREEN RCVD TLK: CPT | Performed by: SURGERY

## 2025-04-16 PROCEDURE — 99203 OFFICE O/P NEW LOW 30 MIN: CPT | Performed by: SURGERY

## 2025-04-16 RX ORDER — ALFUZOSIN HYDROCHLORIDE 10 MG/1
10 TABLET, EXTENDED RELEASE ORAL DAILY
Qty: 30 TABLET | Refills: 0 | Status: SHIPPED | OUTPATIENT
Start: 2025-04-16

## 2025-04-16 ASSESSMENT — PAIN SCALES - GENERAL: PAINLEVEL_OUTOF10: 4

## 2025-04-16 NOTE — PROGRESS NOTES
Subjective   Patient ID: Jordan Singh is a 54 y.o. male who presents for Testicle Pain and Difficulty Urinating.    HPI  He is here for second opinion.  He was a previous patient of Dr. Brody.  He has had BPH with LUTS for nearly a year.  He is mainly bothered by his weak stream and incomplete emptying.  He has been on doxazosin 2 mg tablets.  AUA symptom score 24.  Recently these have not improved his symptoms.  He denies any dysuria or hematuria.  His other complaint is right sided groin and back pain.  He has a prior history of testis trauma, he had a right orchiectomy about a year ago.  He does take NSAIDs for pain relief.            Review of Systems  As per HPI, remaining 10 systems negative            Objective   Physical Exam  Well nourished  Abdomen soft, ND, NT, no hernias  Circumcised, patent meatus, no lesions  Left descended testis, no masses  Right testis surgically absent            Assessment/Plan   Problem List Items Addressed This Visit    None  Visit Diagnoses         Codes      Benign prostatic hyperplasia with weak urinary stream    -  Primary N40.1, R39.12    Relevant Medications    alfuzosin (Uroxatral) 10 mg 24 hr tablet    Other Relevant Orders    POCT UA Automated manually resulted (Completed)      Encounter for screening for malignant neoplasm of prostate     Z12.5             1. BPH.  We discussed the natural history of BPH.  I counseled him on treatment options.  I will change his alpha-blocker to alfuzosin 10 mg daily.  We will have a follow-up phone call in 1 month.        2. Prostate cancer screening. He will get a PSA drawn with his PCP.      Josue Bennett MD 04/16/25 8:39 AM

## 2025-04-21 DIAGNOSIS — R73.9 HYPERGLYCEMIA: ICD-10-CM

## 2025-04-21 DIAGNOSIS — Z12.5 SCREENING PSA (PROSTATE SPECIFIC ANTIGEN): ICD-10-CM

## 2025-04-22 LAB
EST. AVERAGE GLUCOSE BLD GHB EST-MCNC: 114 MG/DL
EST. AVERAGE GLUCOSE BLD GHB EST-SCNC: 6.3 MMOL/L
HBA1C MFR BLD: 5.6 %
PSA SERPL-MCNC: 6.99 NG/ML

## 2025-04-23 DIAGNOSIS — R97.20 ELEVATED PSA: ICD-10-CM

## 2025-05-09 ENCOUNTER — HOSPITAL ENCOUNTER (OUTPATIENT)
Dept: RADIOLOGY | Facility: HOSPITAL | Age: 55
Discharge: HOME | End: 2025-05-09
Payer: COMMERCIAL

## 2025-05-09 ENCOUNTER — APPOINTMENT (OUTPATIENT)
Dept: RADIOLOGY | Facility: HOSPITAL | Age: 55
End: 2025-05-09
Payer: COMMERCIAL

## 2025-05-09 DIAGNOSIS — R97.20 ELEVATED PSA: ICD-10-CM

## 2025-05-09 PROCEDURE — 72197 MRI PELVIS W/O & W/DYE: CPT

## 2025-05-09 PROCEDURE — A9575 INJ GADOTERATE MEGLUMI 0.1ML: HCPCS | Performed by: SURGERY

## 2025-05-09 PROCEDURE — 2550000001 HC RX 255 CONTRASTS: Performed by: SURGERY

## 2025-05-09 RX ORDER — GADOTERATE MEGLUMINE 376.9 MG/ML
19 INJECTION INTRAVENOUS
Status: COMPLETED | OUTPATIENT
Start: 2025-05-09 | End: 2025-05-09

## 2025-05-09 RX ADMIN — GADOTERATE MEGLUMINE 19 ML: 376.9 INJECTION INTRAVENOUS at 13:38

## 2025-05-14 DIAGNOSIS — N40.1 BENIGN PROSTATIC HYPERPLASIA WITH WEAK URINARY STREAM: ICD-10-CM

## 2025-05-14 DIAGNOSIS — R39.12 BENIGN PROSTATIC HYPERPLASIA WITH WEAK URINARY STREAM: ICD-10-CM

## 2025-05-15 RX ORDER — ALFUZOSIN HYDROCHLORIDE 10 MG/1
TABLET, EXTENDED RELEASE ORAL
Qty: 90 TABLET | Refills: 1 | Status: SHIPPED | OUTPATIENT
Start: 2025-05-15

## 2025-06-03 ENCOUNTER — APPOINTMENT (OUTPATIENT)
Dept: UROLOGY | Facility: CLINIC | Age: 55
End: 2025-06-03
Payer: COMMERCIAL

## 2025-06-03 VITALS — DIASTOLIC BLOOD PRESSURE: 88 MMHG | SYSTOLIC BLOOD PRESSURE: 139 MMHG | HEART RATE: 71 BPM

## 2025-06-03 DIAGNOSIS — R39.9 URINARY SYMPTOM OR SIGN: ICD-10-CM

## 2025-06-03 DIAGNOSIS — N40.1 BENIGN PROSTATIC HYPERPLASIA WITH WEAK URINARY STREAM: Primary | ICD-10-CM

## 2025-06-03 DIAGNOSIS — R39.12 BENIGN PROSTATIC HYPERPLASIA WITH WEAK URINARY STREAM: Primary | ICD-10-CM

## 2025-06-03 PROCEDURE — 81002 URINALYSIS NONAUTO W/O SCOPE: CPT | Performed by: SURGERY

## 2025-06-03 PROCEDURE — 52000 CYSTOURETHROSCOPY: CPT | Performed by: SURGERY

## 2025-06-03 ASSESSMENT — PAIN SCALES - GENERAL: PAINLEVEL_OUTOF10: 0-NO PAIN

## 2025-06-03 NOTE — PROGRESS NOTES
Patient ID: Jordan Singh is a 54 y.o. male.  He is here for BPH evaluation.  He did have a prostate MRI.  His gland size is 68 g.  There were no detectable prostate lesions on MRI.      Cystoscopy    Date/Time: 6/3/2025 1:25 PM    Performed by: Josue Bennett MD  Authorized by: Josue Bennett MD    Procedure - Bladder Cystoscopy:     Procedure details: cystoscopy    Post-procedure:     Patient tolerance: Patient tolerated the procedure well with no immediate complications      Comments:      The patient was placed in a supine position.  His genitalia were prepped and draped.  We introduced viscous lidocaine per urethra.  A cystoscope was passed per urethra, and we entered the bladder.  The orifices were identified and appeared normal with clear efflux.  The bladder mucosa was inspected.  No tumors or stones seen.  Prostate is enlarged, severely obstructive.  Moderate sized median lobe.  No strictures seen.          Plan:  Schedule Green light PVP.

## 2025-06-26 ENCOUNTER — PRE-ADMISSION TESTING (OUTPATIENT)
Dept: PREADMISSION TESTING | Facility: HOSPITAL | Age: 55
End: 2025-06-26
Payer: COMMERCIAL

## 2025-06-26 ENCOUNTER — APPOINTMENT (OUTPATIENT)
Dept: LAB | Facility: HOSPITAL | Age: 55
End: 2025-06-26
Payer: COMMERCIAL

## 2025-06-26 VITALS
OXYGEN SATURATION: 97 % | HEIGHT: 71 IN | DIASTOLIC BLOOD PRESSURE: 88 MMHG | BODY MASS INDEX: 28.58 KG/M2 | HEART RATE: 56 BPM | SYSTOLIC BLOOD PRESSURE: 139 MMHG | WEIGHT: 204.15 LBS | RESPIRATION RATE: 16 BRPM | TEMPERATURE: 96.8 F

## 2025-06-26 DIAGNOSIS — Z01.818 PRE-OP EXAMINATION: Primary | ICD-10-CM

## 2025-06-26 LAB
ANION GAP SERPL CALCULATED.3IONS-SCNC: 9 MMOL/L (ref 10–20)
BUN SERPL-MCNC: 19 MG/DL (ref 6–23)
CALCIUM SERPL-MCNC: 9.3 MG/DL (ref 8.6–10.3)
CHLORIDE SERPL-SCNC: 109 MMOL/L (ref 98–107)
CO2 SERPL-SCNC: 27 MMOL/L (ref 21–32)
CREAT SERPL-MCNC: 1.05 MG/DL (ref 0.5–1.3)
EGFRCR SERPLBLD CKD-EPI 2021: 85 ML/MIN/1.73M*2
ERYTHROCYTE [DISTWIDTH] IN BLOOD BY AUTOMATED COUNT: 12.9 % (ref 11.5–14.5)
GLUCOSE SERPL-MCNC: 81 MG/DL (ref 74–99)
HCT VFR BLD AUTO: 37.1 % (ref 41–52)
HGB BLD-MCNC: 12.5 G/DL (ref 13.5–17.5)
MCH RBC QN AUTO: 30.8 PG (ref 26–34)
MCHC RBC AUTO-ENTMCNC: 33.7 G/DL (ref 32–36)
MCV RBC AUTO: 91 FL (ref 80–100)
NRBC BLD-RTO: 0 /100 WBCS (ref 0–0)
PLATELET # BLD AUTO: 310 X10*3/UL (ref 150–450)
POTASSIUM SERPL-SCNC: 4.5 MMOL/L (ref 3.5–5.3)
RBC # BLD AUTO: 4.06 X10*6/UL (ref 4.5–5.9)
SODIUM SERPL-SCNC: 140 MMOL/L (ref 136–145)
WBC # BLD AUTO: 6.7 X10*3/UL (ref 4.4–11.3)

## 2025-06-26 PROCEDURE — 99204 OFFICE O/P NEW MOD 45 MIN: CPT

## 2025-06-26 PROCEDURE — 85027 COMPLETE CBC AUTOMATED: CPT

## 2025-06-26 PROCEDURE — 80048 BASIC METABOLIC PNL TOTAL CA: CPT

## 2025-06-26 RX ORDER — MULTIVIT-MIN/IRON FUM/FOLIC AC 7.5 MG-4
1 TABLET ORAL DAILY
COMMUNITY

## 2025-06-26 ASSESSMENT — DUKE ACTIVITY SCORE INDEX (DASI)
CAN YOU WALK A BLOCK OR TWO ON LEVEL GROUND: YES
CAN YOU CLIMB A FLIGHT OF STAIRS OR WALK UP A HILL: YES
CAN YOU DO MODERATE WORK AROUND THE HOUSE LIKE VACUUMING, SWEEPING FLOORS OR CARRYING GROCERIES: YES
TOTAL_SCORE: 50.7
CAN YOU DO LIGHT WORK AROUND THE HOUSE LIKE DUSTING OR WASHING DISHES: YES
CAN YOU DO YARD WORK LIKE RAKING LEAVES, WEEDING OR PUSHING A MOWER: YES
CAN YOU PARTICIPATE IN STRENOUS SPORTS LIKE SWIMMING, SINGLES TENNIS, FOOTBALL, BASKETBALL, OR SKIING: NO
CAN YOU PARTICIPATE IN MODERATE RECREATIONAL ACTIVITIES LIKE GOLF, BOWLING, DANCING, DOUBLES TENNIS OR THROWING A BASEBALL OR FOOTBALL: YES
DASI METS SCORE: 9
CAN YOU RUN A SHORT DISTANCE: YES
CAN YOU HAVE SEXUAL RELATIONS: YES
CAN YOU TAKE CARE OF YOURSELF (EAT, DRESS, BATHE, OR USE TOILET): YES
CAN YOU DO HEAVY WORK AROUND THE HOUSE LIKE SCRUBBING FLOORS OR LIFTING AND MOVING HEAVY FURNITURE: YES
CAN YOU WALK INDOORS, SUCH AS AROUND YOUR HOUSE: YES

## 2025-06-26 ASSESSMENT — ENCOUNTER SYMPTOMS
EYES NEGATIVE: 1
DIFFICULTY URINATING: 1
MUSCULOSKELETAL NEGATIVE: 1
CARDIOVASCULAR NEGATIVE: 1
HEMATOLOGIC/LYMPHATIC NEGATIVE: 1
CONSTITUTIONAL NEGATIVE: 1
NEUROLOGICAL NEGATIVE: 1
RESPIRATORY NEGATIVE: 1
PSYCHIATRIC NEGATIVE: 1
FREQUENCY: 1
GASTROINTESTINAL NEGATIVE: 1
ENDOCRINE NEGATIVE: 1
ALLERGIC/IMMUNOLOGIC NEGATIVE: 1

## 2025-06-26 ASSESSMENT — PAIN SCALES - GENERAL: PAINLEVEL_OUTOF10: 0 - NO PAIN

## 2025-06-26 ASSESSMENT — PAIN - FUNCTIONAL ASSESSMENT: PAIN_FUNCTIONAL_ASSESSMENT: 0-10

## 2025-06-26 NOTE — H&P (VIEW-ONLY)
CPM/PAT Evaluation       Name: Jordan Singh (Jordan Singh)  /Age: 1970/54 y.o.     In-Person       Chief Complaint: BPH with a weak urinary stream    HPI: Jordan Singh is a 54 year old male with a history of BPH. He has complaints of a weak stream and incomplete bladder emptying. He had a cystoscopy  6/3/25 that revealed an enlarged prostate that is severely obstructive. He states he has frequency with occasional discomfort. He denies blood in the urine or abdominal pain. He is scheduled for a prostate ablation. He denies fever, chills, nausea, vomiting, chest pain, SOB, and dizziness.    Medical History[1]    Surgical History[2]    Social History     Tobacco Use    Smoking status: Some Days     Current packs/day: 0.25     Average packs/day: 1 pack/day for 39.5 years (37.6 ttl pk-yrs)     Types: Cigarettes     Start date:     Smokeless tobacco: Never   Substance Use Topics    Alcohol use: Never     Social History     Substance and Sexual Activity   Drug Use Never         Family History[3]    Allergies[4]  Current Outpatient Medications   Medication Sig Dispense Refill    cyclobenzaprine (Flexeril) 10 mg tablet Take 1 tablet (10 mg) by mouth as needed at bedtime for muscle spasms. 30 tablet 0    alfuzosin (Uroxatral) 10 mg 24 hr tablet TAKE 1 TABLET(10 MG) BY MOUTH DAILY. DO NOT CRUSH, CHEW, OR SPLIT 90 tablet 1    multivitamin with minerals tablet Take 1 tablet by mouth once daily.       No current facility-administered medications for this visit.   '     Review of Systems   Constitutional: Negative.    HENT: Negative.     Eyes: Negative.    Respiratory: Negative.     Cardiovascular: Negative.    Gastrointestinal: Negative.    Endocrine: Negative.    Genitourinary:  Positive for difficulty urinating, frequency and urgency.   Musculoskeletal: Negative.    Skin: Negative.    Allergic/Immunologic: Negative.    Neurological: Negative.    Hematological: Negative.    Psychiatric/Behavioral: Negative.    "          Physical Exam  Vitals reviewed.   Constitutional:       Appearance: Normal appearance.   HENT:      Head: Normocephalic and atraumatic.      Nose: Nose normal.      Mouth/Throat:      Mouth: Mucous membranes are moist.      Pharynx: Oropharynx is clear.   Eyes:      Extraocular Movements: Extraocular movements intact.      Conjunctiva/sclera: Conjunctivae normal.   Cardiovascular:      Rate and Rhythm: Normal rate and regular rhythm.      Pulses: Normal pulses.      Heart sounds: Normal heart sounds.   Pulmonary:      Effort: Pulmonary effort is normal.      Breath sounds: Normal breath sounds.   Abdominal:      General: Bowel sounds are normal.      Palpations: Abdomen is soft.   Genitourinary:     Comments: Assessment deferred to physician  Musculoskeletal:         General: Normal range of motion.      Cervical back: Normal range of motion and neck supple.   Skin:     General: Skin is warm and dry.   Neurological:      General: No focal deficit present.      Mental Status: He is alert and oriented to person, place, and time.   Psychiatric:         Mood and Affect: Mood normal.         Behavior: Behavior normal.         Thought Content: Thought content normal.         Judgment: Judgment normal.          PAT AIRWAY:   Airway:     Mallampati::  I    TM distance::  >3 FB    Neck ROM::  Full  normal          Visit Vitals  /88   Pulse 56   Temp 36 °C (96.8 °F) (Temporal)   Resp 16   Ht 1.803 m (5' 11\")   Wt 92.6 kg (204 lb 2.3 oz)   SpO2 97%   BMI 28.47 kg/m²   Smoking Status Some Days   BSA 2.15 m²     ASA: II  CHADS2: 1.9%  RCRI: 0.4%  DASI: 50.7  METS: 9  STOP BAN        Assessment and Plan:     Benign prostatic hyperplasia with weak urinary stream : ABLATION, PROSTATE, TRANSURETHRAL     LABS: CBC, BMP ordered in PAT         [1]   Past Medical History:  Diagnosis Date    Bleeding external hemorrhoids     Carpal tunnel syndrome of left wrist    [2]   Past Surgical History:  Procedure Laterality " Date    HAND FUSION      HEMORRHOID SURGERY      NASAL/SINUS ENDOSCOPY      TESTICLE SURGERY     [3] No family history on file.  [4]   Allergies  Allergen Reactions    Cinnamon Hives     cinnamon    Codeine Itching and Unknown

## 2025-06-26 NOTE — PREPROCEDURE INSTRUCTIONS
Medication List            Accurate as of June 26, 2025 12:52 PM. Always use your most recent med list.                alfuzosin 10 mg 24 hr tablet  Commonly known as: Uroxatral  TAKE 1 TABLET(10 MG) BY MOUTH DAILY. DO NOT CRUSH, CHEW, OR SPLIT  Medication Adjustments for Surgery: Take on the morning of surgery     cyclobenzaprine 10 mg tablet  Commonly known as: Flexeril  Take 1 tablet (10 mg) by mouth as needed at bedtime for muscle spasms.  Medication Adjustments for Surgery: Do Not take on the morning of surgery     multivitamin with minerals tablet  Additional Medication Adjustments for Surgery: Take last dose 7 days before surgery                              NPO Instructions:        Preoperative Fasting Guidelines    Why must I stop eating and drinking near surgery time?  With sedation, food or liquid in your stomach can enter your lungs causing serious complications  Increases nausea and vomiting    When do I need to stop eating and drinking before my surgery?  Do not eat any food after midnight the night before your surgery/procedure.  You may have up to 13.5 ounces of clear liquid until TWO hours before your instructed arrival time to the hospital.  This includes water, black tea/coffee, (no milk or cream) apple juice, and electrolyte drinks (Gatorade)  You may chew gum until TWO hours before your surgery/procedure    Additional Instructions:     Day of Surgery:  Review your medication instructions, take indicated medications  Wear  comfortable loose fitting clothing  Do not use moisturizers, creams, lotions or perfume  All jewelry and valuables should be left at home    PAT DISCHARGE INSTRUCTIONS    The Same Day Surgery (SDS) Department of the hospital where your procedure will be performed will contact you after 2:00 PM the day before your surgery. If you are scheduled on a Monday, or a Tuesday following a Monday holiday, they will call on the last business day prior to your surgery.  Please check  your voicemail for any missed messages.        [facility]  [address] [facility] [address], [address] [phone]  Second Floor  *PLEASE CALL ABOVE NUMBER FOR ARRIVAL TIME       Please let your surgeon know if:      You develop any open sores, shingles, burning or painful urination as these may increase your risk of an infection.   You no longer wish to have the surgery.   Any other personal circumstances change that may lead to the need to cancel or defer this surgery-such as being sick or getting admitted to any hospital within one week of your planned procedure.    Your contact details change, such as a change of address or phone number.    Starting now:     Please DO NOT drink alcohol or smoke for 24 hours before surgery. It is well known that quitting smoking can make a huge difference to your health and recovery from surgery. The longer you abstain from smoking, the better your chances of a healthy recovery. If you need help with quitting, call 9-094-QUIT-NOW to be connected to a trained counselor who will discuss the best methods to help you quit.     Before your surgery:    Please stop all supplements 7 days prior to surgery. Or as directed by your surgeon.   Please stop taking NSAID pain medicine such as Advil and Motrin 7 days before surgery.    If you develop any fever, cough, cold, rashes, cuts, scratches, scrapes, urinary symptoms or infection anywhere on your body (including teeth and gums) prior to surgery, please call your surgeon’s office as soon as possible. This may require treatment to reduce the chance of cancellation on the day of surgery.    The day before your surgery:   DIET- Please follow the diet instructions at the top of your packet.   Get a good night’s rest.  Use the special soap for bathing if you have been instructed to use one.    Scheduled surgery times may change and you will be notified if this occurs - please check your personal voicemail for  any updates.     On the morning of surgery:   Wear comfortable, loose fitting clothes which open in the front. Please do not wear moisturizers, creams, lotions, makeup or perfume.    Please bring with you to surgery:   Photo ID and insurance card   Current list of medicines and allergies   Pacemaker/ Defibrillator/Heart stent cards   CPAP machine and mask    Slings/ splints/ crutches   A copy of your complete advanced directive/DHPOA.    Please do NOT bring with you to surgery:   All jewelry and valuables should be left at home.   Prosthetic devices such as contact lenses, hearing aids, dentures, eyelash extensions, hairpins and body piercings must be removed prior to going in to the surgical suite.    After outpatient surgery:   A responsible adult MUST accompany you at the time of discharge and stay with you for 24 hours after your surgery. You may NOT drive yourself home after surgery.    Do not drive, operate machinery, make critical decisions or do activities that require co-ordination or balance until after a night’s sleep.   Do not drink alcoholic beverages for 24 hours.   Instructions for resuming your medications will be provided by your surgeon.    CALL YOUR DOCTOR AFTER SURGERY IF YOU HAVE:     Chills and/or a fever of 101° F or higher.    Redness, swelling, pus or drainage from your surgical wound or a bad smell from the wound.    Lightheadedness, fainting or confusion.    Persistent vomiting (throwing up) and are not able to eat or drink for 12 hours.    Three or more loose, watery bowel movements in 24 hours (diarrhea).   Difficulty or pain while urinating( after non-urological surgery)    Pain and swelling in your legs, especially if it is only on one side.    Difficulty breathing or are breathing faster than normal.    Any new concerning symptoms.

## 2025-07-02 ENCOUNTER — HOSPITAL ENCOUNTER (OUTPATIENT)
Facility: HOSPITAL | Age: 55
Setting detail: OUTPATIENT SURGERY
Discharge: HOME | End: 2025-07-02
Attending: SURGERY | Admitting: SURGERY
Payer: COMMERCIAL

## 2025-07-02 ENCOUNTER — ANESTHESIA (OUTPATIENT)
Dept: OPERATING ROOM | Facility: HOSPITAL | Age: 55
End: 2025-07-02
Payer: COMMERCIAL

## 2025-07-02 ENCOUNTER — ANESTHESIA EVENT (OUTPATIENT)
Dept: OPERATING ROOM | Facility: HOSPITAL | Age: 55
End: 2025-07-02
Payer: COMMERCIAL

## 2025-07-02 VITALS
BODY MASS INDEX: 28.58 KG/M2 | HEART RATE: 55 BPM | OXYGEN SATURATION: 99 % | HEIGHT: 71 IN | DIASTOLIC BLOOD PRESSURE: 63 MMHG | TEMPERATURE: 97.2 F | SYSTOLIC BLOOD PRESSURE: 110 MMHG | WEIGHT: 204.15 LBS | RESPIRATION RATE: 12 BRPM

## 2025-07-02 DIAGNOSIS — N40.1 BENIGN PROSTATIC HYPERPLASIA WITH WEAK URINARY STREAM: Primary | ICD-10-CM

## 2025-07-02 DIAGNOSIS — R39.12 BENIGN PROSTATIC HYPERPLASIA WITH WEAK URINARY STREAM: Primary | ICD-10-CM

## 2025-07-02 PROCEDURE — 3700000002 HC GENERAL ANESTHESIA TIME - EACH INCREMENTAL 1 MINUTE: Performed by: SURGERY

## 2025-07-02 PROCEDURE — 52648 LASER SURGERY OF PROSTATE: CPT | Performed by: SURGERY

## 2025-07-02 PROCEDURE — 7100000001 HC RECOVERY ROOM TIME - INITIAL BASE CHARGE: Performed by: SURGERY

## 2025-07-02 PROCEDURE — 3600000005 HC OR TIME - INITIAL BASE CHARGE - PROCEDURE LEVEL FIVE: Performed by: SURGERY

## 2025-07-02 PROCEDURE — 2500000004 HC RX 250 GENERAL PHARMACY W/ HCPCS (ALT 636 FOR OP/ED): Performed by: NURSE ANESTHETIST, CERTIFIED REGISTERED

## 2025-07-02 PROCEDURE — 3600000010 HC OR TIME - EACH INCREMENTAL 1 MINUTE - PROCEDURE LEVEL FIVE: Performed by: SURGERY

## 2025-07-02 PROCEDURE — 2720000007 HC OR 272 NO HCPCS: Performed by: SURGERY

## 2025-07-02 PROCEDURE — 7100000010 HC PHASE TWO TIME - EACH INCREMENTAL 1 MINUTE: Performed by: SURGERY

## 2025-07-02 PROCEDURE — 2500000004 HC RX 250 GENERAL PHARMACY W/ HCPCS (ALT 636 FOR OP/ED): Performed by: SURGERY

## 2025-07-02 PROCEDURE — A52648 PR LASER VAPORIZATION SURGERY PROSTATE, COMPLETE: Performed by: NURSE ANESTHETIST, CERTIFIED REGISTERED

## 2025-07-02 PROCEDURE — A52648 PR LASER VAPORIZATION SURGERY PROSTATE, COMPLETE: Performed by: ANESTHESIOLOGY

## 2025-07-02 PROCEDURE — 7100000009 HC PHASE TWO TIME - INITIAL BASE CHARGE: Performed by: SURGERY

## 2025-07-02 PROCEDURE — 7100000002 HC RECOVERY ROOM TIME - EACH INCREMENTAL 1 MINUTE: Performed by: SURGERY

## 2025-07-02 PROCEDURE — 3700000001 HC GENERAL ANESTHESIA TIME - INITIAL BASE CHARGE: Performed by: SURGERY

## 2025-07-02 RX ORDER — HYDROMORPHONE HYDROCHLORIDE 0.2 MG/ML
0.1 INJECTION INTRAMUSCULAR; INTRAVENOUS; SUBCUTANEOUS EVERY 5 MIN PRN
Status: DISCONTINUED | OUTPATIENT
Start: 2025-07-02 | End: 2025-07-02 | Stop reason: HOSPADM

## 2025-07-02 RX ORDER — ALBUTEROL SULFATE 0.83 MG/ML
2.5 SOLUTION RESPIRATORY (INHALATION) ONCE AS NEEDED
Status: DISCONTINUED | OUTPATIENT
Start: 2025-07-02 | End: 2025-07-02 | Stop reason: HOSPADM

## 2025-07-02 RX ORDER — LIDOCAINE HYDROCHLORIDE 20 MG/ML
INJECTION, SOLUTION EPIDURAL; INFILTRATION; INTRACAUDAL; PERINEURAL AS NEEDED
Status: DISCONTINUED | OUTPATIENT
Start: 2025-07-02 | End: 2025-07-02

## 2025-07-02 RX ORDER — HYDROMORPHONE HYDROCHLORIDE 0.2 MG/ML
0.2 INJECTION INTRAMUSCULAR; INTRAVENOUS; SUBCUTANEOUS EVERY 5 MIN PRN
Status: DISCONTINUED | OUTPATIENT
Start: 2025-07-02 | End: 2025-07-02 | Stop reason: HOSPADM

## 2025-07-02 RX ORDER — SODIUM CHLORIDE, SODIUM LACTATE, POTASSIUM CHLORIDE, CALCIUM CHLORIDE 600; 310; 30; 20 MG/100ML; MG/100ML; MG/100ML; MG/100ML
50 INJECTION, SOLUTION INTRAVENOUS CONTINUOUS
Status: DISCONTINUED | OUTPATIENT
Start: 2025-07-02 | End: 2025-07-02 | Stop reason: HOSPADM

## 2025-07-02 RX ORDER — ONDANSETRON HYDROCHLORIDE 2 MG/ML
INJECTION, SOLUTION INTRAVENOUS AS NEEDED
Status: DISCONTINUED | OUTPATIENT
Start: 2025-07-02 | End: 2025-07-02

## 2025-07-02 RX ORDER — PROPOFOL 10 MG/ML
INJECTION, EMULSION INTRAVENOUS AS NEEDED
Status: DISCONTINUED | OUTPATIENT
Start: 2025-07-02 | End: 2025-07-02

## 2025-07-02 RX ORDER — SODIUM CHLORIDE, SODIUM LACTATE, POTASSIUM CHLORIDE, CALCIUM CHLORIDE 600; 310; 30; 20 MG/100ML; MG/100ML; MG/100ML; MG/100ML
100 INJECTION, SOLUTION INTRAVENOUS CONTINUOUS
Status: DISCONTINUED | OUTPATIENT
Start: 2025-07-02 | End: 2025-07-02 | Stop reason: HOSPADM

## 2025-07-02 RX ORDER — LIDOCAINE HYDROCHLORIDE 10 MG/ML
0.1 INJECTION, SOLUTION INFILTRATION; PERINEURAL ONCE
Status: DISCONTINUED | OUTPATIENT
Start: 2025-07-02 | End: 2025-07-02 | Stop reason: HOSPADM

## 2025-07-02 RX ORDER — MIDAZOLAM HYDROCHLORIDE 1 MG/ML
INJECTION, SOLUTION INTRAMUSCULAR; INTRAVENOUS AS NEEDED
Status: DISCONTINUED | OUTPATIENT
Start: 2025-07-02 | End: 2025-07-02

## 2025-07-02 RX ORDER — HYDRALAZINE HYDROCHLORIDE 20 MG/ML
5 INJECTION INTRAMUSCULAR; INTRAVENOUS EVERY 30 MIN PRN
Status: DISCONTINUED | OUTPATIENT
Start: 2025-07-02 | End: 2025-07-02 | Stop reason: HOSPADM

## 2025-07-02 RX ORDER — LEVOFLOXACIN 5 MG/ML
500 INJECTION, SOLUTION INTRAVENOUS ONCE
Status: COMPLETED | OUTPATIENT
Start: 2025-07-02 | End: 2025-07-02

## 2025-07-02 RX ORDER — ONDANSETRON HYDROCHLORIDE 2 MG/ML
4 INJECTION, SOLUTION INTRAVENOUS ONCE AS NEEDED
Status: DISCONTINUED | OUTPATIENT
Start: 2025-07-02 | End: 2025-07-02 | Stop reason: HOSPADM

## 2025-07-02 RX ORDER — FENTANYL CITRATE 50 UG/ML
INJECTION, SOLUTION INTRAMUSCULAR; INTRAVENOUS AS NEEDED
Status: DISCONTINUED | OUTPATIENT
Start: 2025-07-02 | End: 2025-07-02

## 2025-07-02 RX ADMIN — ONDANSETRON HYDROCHLORIDE 4 MG: 2 INJECTION INTRAMUSCULAR; INTRAVENOUS at 09:19

## 2025-07-02 RX ADMIN — MIDAZOLAM 2 MG: 1 INJECTION INTRAMUSCULAR; INTRAVENOUS at 08:57

## 2025-07-02 RX ADMIN — LIDOCAINE HYDROCHLORIDE 100 MG: 20 INJECTION, SOLUTION EPIDURAL; INFILTRATION; INTRACAUDAL; PERINEURAL at 09:03

## 2025-07-02 RX ADMIN — LEVOFLOXACIN 500 MG: 500 INJECTION, SOLUTION INTRAVENOUS at 07:47

## 2025-07-02 RX ADMIN — FENTANYL CITRATE 50 MCG: 50 INJECTION, SOLUTION INTRAMUSCULAR; INTRAVENOUS at 09:15

## 2025-07-02 RX ADMIN — SODIUM CHLORIDE, POTASSIUM CHLORIDE, SODIUM LACTATE AND CALCIUM CHLORIDE 50 ML/HR: 600; 310; 30; 20 INJECTION, SOLUTION INTRAVENOUS at 07:32

## 2025-07-02 RX ADMIN — PROPOFOL 200 MG: 10 INJECTION, EMULSION INTRAVENOUS at 09:03

## 2025-07-02 RX ADMIN — DEXAMETHASONE SODIUM PHOSPHATE 4 MG: 4 INJECTION, SOLUTION INTRAMUSCULAR; INTRAVENOUS at 09:12

## 2025-07-02 RX ADMIN — FENTANYL CITRATE 50 MCG: 50 INJECTION, SOLUTION INTRAMUSCULAR; INTRAVENOUS at 09:03

## 2025-07-02 SDOH — HEALTH STABILITY: MENTAL HEALTH: CURRENT SMOKER: 1

## 2025-07-02 ASSESSMENT — PAIN - FUNCTIONAL ASSESSMENT
PAIN_FUNCTIONAL_ASSESSMENT: 0-10
PAIN_FUNCTIONAL_ASSESSMENT: CPOT (CRITICAL CARE PAIN OBSERVATION TOOL)
PAIN_FUNCTIONAL_ASSESSMENT: 0-10

## 2025-07-02 ASSESSMENT — PAIN SCALES - GENERAL
PAINLEVEL_OUTOF10: 0 - NO PAIN
PAINLEVEL_OUTOF10: 2
PAINLEVEL_OUTOF10: 0 - NO PAIN

## 2025-07-02 ASSESSMENT — PAIN DESCRIPTION - DESCRIPTORS: DESCRIPTORS: ACHING

## 2025-07-02 NOTE — SIGNIFICANT EVENT
Bedside report to Nerissa BOBBY to assume care of the pt in Phase 2 PACU.. All questions answered. Transferred to [facility] [place]. Status stable.

## 2025-07-02 NOTE — POST-PROCEDURE NOTE
ADMITTED TO [facility]. ALERT AND ORIENTED. GAIT STEADY. NO NOTED RESP. DISTRESS. ORIENTED TO THE SURROUNDINGS. VSS. IV STARTED AND FLUIDS STARTED AT [facility]. MEDS GIVEN ACCORDING TO MD ORDERS. IV LEVAQUIN STARTED.CALL BELL IN REACH. READY FOR PROCEDURE. NO NOTED DISTRESS.  
Patient alert and oriented. No c/o nausea or pain. Tolerating ginger ale and cookies. Vitals stable.    1100: AVS reviewed with patient. Questions answered. Education on catheter care given to patient at bedside. Print out with pictures and instructions included in AVS for catheter care. Supplies given to patient.     1115: 750 clear urine drained from catheter.    1125: Patient getting dressed and ready for DC home.  
Yes...

## 2025-07-02 NOTE — OP NOTE
ABLATION, PROSTATE, TRANSURETHRAL Operative Note     Date: 2025  OR Location: [facility] OR    Name: Jordan Singh, : 1970, Age: 54 y.o., MRN: [id number], Sex: male    Diagnosis  Pre-op Diagnosis      * Benign prostatic hyperplasia with weak urinary stream [N40.1, R39.12] Post-op Diagnosis     * Benign prostatic hyperplasia with weak urinary stream [N40.1, R39.12]     Procedures  ABLATION, PROSTATE, TRANSURETHRAL  15250 - SC LASER VAPORIZATION OF PROSTATE FOR URINE FLOW      Surgeons      * Josue Bennett - Primary    Resident/Fellow/Other Assistant:  Surgeons and Role:  * No surgeons found with a matching role *    Staff:   Circulator: Michaelle Gunn Person: Anna    Anesthesia Staff: Anesthesiologist: Tyler Mares MD  CRNA: KOSTAS Nicole-CRNA    Procedure Summary  Anesthesia: General  ASA: II  Estimated Blood Loss: 10 mL  Intra-op Medications:   Administrations occurring from 0845 to 1000 on 25:   Medication Name Total Dose   dexAMETHasone (Decadron) 4 mg/mL IV Syringe 2 mL 4 mg   fentaNYL (Sublimaze) injection 50 mcg/mL 100 mcg   lactated Ringer's infusion Cannot be calculated   lidocaine PF (Xylocaine-MPF) local injection 2 % 100 mg   midazolam (Versed) injection 1 mg/mL 2 mg   ondansetron (Zofran) 2 mg/mL injection 4 mg   propofol (Diprivan) injection 10 mg/mL 200 mg              Anesthesia Record               Intraprocedure I/O Totals          Intake    lactated Ringer's infusion 300.00 mL    Total Intake 300 mL          Specimen: No specimens collected              Drains and/or Catheters:   Urethral Catheter Non-latex 20 Fr. (Active)       Tourniquet Times:         Implants:     Findings: 1. Trilobar prostatic hypertrophy.     Indications: Jordan Singh is an 54 y.o. male who is having surgery for Benign prostatic hyperplasia with weak urinary stream [N40.1, R39.12].     The patient was seen in the preoperative area. The risks, benefits, complications, treatment options, non-operative  alternatives, expected recovery and outcomes were discussed with the patient. The possibilities of reaction to medication, pulmonary aspiration, injury to surrounding structures, bleeding, recurrent infection, the need for additional procedures, failure to diagnose a condition, and creating a complication requiring transfusion or operation were discussed with the patient. The patient concurred with the proposed plan, giving informed consent.  The site of surgery was properly noted/marked if necessary per policy. The patient has been actively warmed in preoperative area. Preoperative antibiotics have been ordered and given within 1 hours of incision. Venous thrombosis prophylaxis have been ordered including bilateral sequential compression devices    Procedure Details: The patient was brought to the operating room table.  General anesthesia was induced.  The patient was placed in the dorsolithotomy position.  His genitalia were prepped and draped in usual sterile fashion.  We introduced a cystoscope into the urethra and we entered the bladder.  We briefly inspected the bladder mucosa.  There were no abnormalities noted.  We next examined the prostate.  There was evidence of moderate to severe trilobar hypertrophy of the prostate.  We next introduced our greenlight laser fiber.  We began our laser vaporization.  We first started at the median lobe of the prostate.  We used a power setting of 80 W.  We were able to vaporize the median lobe down towards the bladder neck.  We then increased our setting to 120-140 W.  We began to vaporize the lateral lobes of the prostate.  Starting at the bladder neck we vaporized the obstructive tissue out towards the prostate capsule.  We made our way towards the verumontanum.  Once we were satisfied with the left lateral lobe we then turned our attention to the right lateral lobe.  Starting at the bladder neck we vaporized the obstructive tissue.  We made our way towards the  verumontanum.  A significant amount of tissue was vaporized.  We used a total of 105,000 J of energy in order to vaporize the prostate.  We did encounter some bleeding within the lateral lobes.  This was carefully controlled using coagulation.  At the end we visualized an open anterior channel.  Once we had good hemostasis we then removed our scope.  At the end we placed a 20 [place] Johnson catheter per urethra and we drained the bladder.  The patient was awakened and brought to the recovery room in stable condition.  Evidence of Infection: No   Complications:  None; patient tolerated the procedure well.    Disposition: PACU - hemodynamically stable.  Condition: stable                 Additional Details:     Attending Attestation: I was present and scrubbed for the entire procedure.    Josue Bennett  Phone Number: [phone]

## 2025-07-02 NOTE — ANESTHESIA POSTPROCEDURE EVALUATION
Patient: Jordan Singh    Procedure Summary       Date: 07/02/25 Room / Location: [facility] OR 07 / [facility]    Anesthesia Start: 0858 Anesthesia Stop: 0952    Procedure: ABLATION, PROSTATE, TRANSURETHRAL (Prostate) Diagnosis:       Benign prostatic hyperplasia with weak urinary stream      (Benign prostatic hyperplasia with weak urinary stream [N40.1, R39.12])    Surgeons: Josue Bennett MD Responsible Provider: Tyler Mares MD    Anesthesia Type: general ASA Status: 2            Anesthesia Type: general    Vitals Value Taken Time   /78 07/02/25 10:00   Temp 36.2 °C (97.2 °F) 07/02/25 09:45   Pulse 60 07/02/25 10:00   Resp 14 07/02/25 10:00   SpO2 97 % 07/02/25 10:00       Anesthesia Post Evaluation    Patient location during evaluation: PACU  Patient participation: complete - patient participated  Level of consciousness: awake and alert  Pain management: adequate  Airway patency: patent  Cardiovascular status: acceptable  Respiratory status: acceptable  Hydration status: acceptable  Postoperative Nausea and Vomiting: none        There were no known notable events for this encounter.

## 2025-07-02 NOTE — ANESTHESIA PREPROCEDURE EVALUATION
Patient: Jordan Singh    Procedure Information       Date/Time: 07/02/25 0845    Procedure: ABLATION, PROSTATE, TRANSURETHRAL    Location: [facility] OR 07 / [facility]    Surgeons: Josue Bennett MD        Medical History[1]     Relevant Problems   Neuro   (+) Carpal tunnel syndrome of left wrist      GI   (+) Bleeding external hemorrhoids      /Renal   (+) Benign prostatic hyperplasia with weak urinary stream      Musculoskeletal   (+) Bulging of intervertebral disc between L4 and L5   (+) Carpal tunnel syndrome of left wrist   (+) DDD (degenerative disc disease), lumbosacral   Surgical History[2]     Clinical information reviewed:   Tobacco  Allergies  Meds   Med Hx  Surg Hx   Fam Hx          NPO Detail:  No data recorded     Physical Exam    Airway  Mallampati: II  TM distance: >3 FB  Neck ROM: full     Cardiovascular    Dental    Pulmonary    Abdominal            Anesthesia Plan    History of general anesthesia?: yes  History of complications of general anesthesia?: no    ASA 2     general     The patient is a current smoker.  Patient was previously instructed to abstain from smoking on day of procedure.  Patient did not smoke on day of procedure.    intravenous induction   Postoperative pain plan includes opioids.  Anesthetic plan and risks discussed with patient.    Plan discussed with attending.           [1]   Past Medical History:  Diagnosis Date    Bleeding external hemorrhoids     Carpal tunnel syndrome of left wrist    [2]   Past Surgical History:  Procedure Laterality Date    HAND FUSION      HEMORRHOID SURGERY      NASAL/SINUS ENDOSCOPY      TESTICLE SURGERY

## 2025-07-03 ENCOUNTER — OFFICE VISIT (OUTPATIENT)
Dept: UROLOGY | Facility: CLINIC | Age: 55
End: 2025-07-03
Payer: COMMERCIAL

## 2025-07-03 DIAGNOSIS — N40.1 BENIGN PROSTATIC HYPERPLASIA WITH WEAK URINARY STREAM: Primary | ICD-10-CM

## 2025-07-03 DIAGNOSIS — R39.12 BENIGN PROSTATIC HYPERPLASIA WITH WEAK URINARY STREAM: Primary | ICD-10-CM

## 2025-07-03 PROCEDURE — 4004F PT TOBACCO SCREEN RCVD TLK: CPT | Performed by: NURSE PRACTITIONER

## 2025-07-03 PROCEDURE — 51700 IRRIGATION OF BLADDER: CPT | Performed by: NURSE PRACTITIONER

## 2025-07-03 PROCEDURE — 99024 POSTOP FOLLOW-UP VISIT: CPT | Performed by: NURSE PRACTITIONER

## 2025-07-03 NOTE — PROGRESS NOTES
Trial of Void:    Patient here today for trial void. Patient verifed by name/.   Patient tolerated 150 cc of sterile water.  Catheter was removed and patient voided 175 cc.   Patient instructed to give the office a call with and issues or concerns, patient also instructed go to emergency room after office hours if unable to void.    Pita Muñoz MA

## 2025-07-03 NOTE — PROGRESS NOTES
[facility]  Outpatient Clinic Note    Subjective   Jordan Singh is a 54 y.o. male    History of Present Illness   Patient presenting to clinic today for TOV s/p PVP with Dr. Bennett 7/2/2025  History of BPH with LUTS. Negative MRI. 68 g. He has been doing well since surgery. Urine has become clear, yellow without evidence of gross hematuria or clots. He denies any fevers or chills.    Past Medical History and Surgical History   Medical History[1]  Surgical History[2]    Medications  Medications Ordered Prior to Encounter[3]    Objective   Physicial Exam  General: Well developed, well nourished, alert and cooperative, appears in no acute distress  Eyes: Non-injected conjunctiva, sclera clear, no proptosis  Cardiac: Extremities are warm and well perfused. No edema, cyanosis or pallor.   Lungs: Breathing is easy, non-labored. Speaking in clear and complete sentences. Normal diaphragmatic movement.  MSK: Ambulatory with steady gait, unassisted  Neuro: alert and oriented to person, place and time  Psych: Demonstrates good judgement and reason, without hallucinations, abnormal affect or abnormal behaviors.  Skin: no obvious lesions, no rashes.    Pre-Admission Testing on 06/26/2025   Component Date Value Ref Range Status    WBC 06/26/2025 6.7  4.4 - 11.3 x10*3/uL Final    nRBC 06/26/2025 0.0  0.0 - 0.0 /100 WBCs Final    RBC 06/26/2025 4.06 (L)  4.50 - 5.90 x10*6/uL Final    Hemoglobin 06/26/2025 12.5 (L)  13.5 - 17.5 g/dL Final    Hematocrit 06/26/2025 37.1 (L)  41.0 - 52.0 % Final    MCV 06/26/2025 91  80 - 100 fL Final    MCH 06/26/2025 30.8  26.0 - 34.0 pg Final    MCHC 06/26/2025 33.7  32.0 - 36.0 g/dL Final    RDW 06/26/2025 12.9  11.5 - 14.5 % Final    Platelets 06/26/2025 310  150 - 450 x10*3/uL Final    Glucose 06/26/2025 81  74 - 99 mg/dL Final    Sodium 06/26/2025 140  136 - 145 mmol/L Final    Potassium 06/26/2025 4.5  3.5 - 5.3 mmol/L Final    Chloride 06/26/2025 109 (H)  98 - 107 mmol/L Final     Bicarbonate 06/26/2025 27  21 - 32 mmol/L Final    Anion Gap 06/26/2025 9 (L)  10 - 20 mmol/L Final    Urea Nitrogen 06/26/2025 19  6 - 23 mg/dL Final    Creatinine 06/26/2025 1.05  0.50 - 1.30 mg/dL Final    eGFR 06/26/2025 85  >60 mL/min/1.73m*2 Final    Calculations of estimated GFR are performed using the 2021 CKD-EPI Study Refit equation without the race variable for the IDMS-Traceable creatinine methods.  https://jasn.asnjournals.org/content/early/2021/09/22/ASN.7387713052    Calcium 06/26/2025 9.3  8.6 - 10.3 mg/dL Final      Review of Systems  All other systems have been reviewed and are negative for complaint.      Assessment and Plan     [] TOV today passed   [] Drink plenty of fluids to maintain hydration and clear urine output  [] You may have some irritative voiding symptoms over the next few days: burning, frequency, urgency  [] Activity restrictions reviewed  []Patient instructed to go to ED if unable to void in 4-6 hr or unable to void with urge     He will return to clinic as previously scheduled for post-operative visit with Dr. Bennett, or sooner if needed.    All questions and concerns were addressed. Patient verbalizes understanding and has no other questions at this time.     Marie Brennan-- [facility]  Office Phone:  [phone]             [1]   Past Medical History:  Diagnosis Date    Bleeding external hemorrhoids     Carpal tunnel syndrome of left wrist    [2]   Past Surgical History:  Procedure Laterality Date    HAND FUSION      HEMORRHOID SURGERY      NASAL/SINUS ENDOSCOPY      TESTICLE SURGERY     [3]   Current Outpatient Medications on File Prior to Visit   Medication Sig Dispense Refill    alfuzosin (Uroxatral) 10 mg 24 hr tablet TAKE 1 TABLET(10 MG) BY MOUTH DAILY. DO NOT CRUSH, CHEW, OR SPLIT 90 tablet 1    cyclobenzaprine (Flexeril) 10 mg tablet Take 1 tablet (10 mg) by mouth as needed at bedtime for muscle spasms. 30 tablet 0    multivitamin with minerals tablet Take 1 tablet by  mouth once daily.      [DISCONTINUED] meloxicam (Mobic) 15 mg tablet Take 1 tablet (15 mg) by mouth once daily. (Patient taking differently: Take 1 tablet (15 mg) by mouth if needed.) 30 tablet 11    [DISCONTINUED] oxyCODONE (Roxicodone) 5 mg immediate release tablet Take 1 tablet (5 mg) by mouth every 6 hours if needed for severe pain (7 - 10). (Patient not taking: Reported on 4/16/2025) 6 tablet 0     Current Facility-Administered Medications on File Prior to Visit   Medication Dose Route Frequency Provider Last Rate Last Admin    [COMPLETED] levoFLOXacin (Levaquin) 500 mg in dextrose 5%  mL  500 mg intravenous Once Josue Bennett  mL/hr at 07/02/25 0747 500 mg at 07/02/25 0747    [DISCONTINUED] albuterol 2.5 mg /3 mL (0.083 %) nebulizer solution 2.5 mg  2.5 mg nebulization Once PRN Tyler Mares MD        [DISCONTINUED] dexAMETHasone (Decadron) injection   intravenous PRN Laurie Galvin APRN-CRNA   4 mg at 07/02/25 0912    [DISCONTINUED] fentaNYL PF (Sublimaze) injection   intravenous PRN Laurie Galvin APRN-CRNA   50 mcg at 07/02/25 0915    [DISCONTINUED] hydrALAZINE (Apresoline) injection 5 mg  5 mg intravenous q30 min PRN Tyler Mares MD        [DISCONTINUED] HYDROmorphone (Dilaudid) injection 0.5 mg  0.5 mg intravenous q5 min PRN Tyler Mares MD        [DISCONTINUED] HYDROmorphone PF (Dilaudid) injection 0.1 mg  0.1 mg intravenous q5 min PRN Tyler Mares MD        [DISCONTINUED] HYDROmorphone PF (Dilaudid) injection 0.2 mg  0.2 mg intravenous q5 min PRN Tyler Mares MD        [DISCONTINUED] lactated Ringer's infusion  50 mL/hr intravenous Continuous Josue Bennett MD   Stopped at 07/02/25 0947    [DISCONTINUED] lactated Ringer's infusion  100 mL/hr intravenous Continuous Tyler Mares MD        [DISCONTINUED] lidocaine (Xylocaine) 10 mg/mL (1 %) injection 0.1 mL  0.1 mL subcutaneous Once Tyler Mares MD        [DISCONTINUED] lidocaine PF (Xylocaine) 20 mg/mL (2 %)  injection   epidural PRN Laurie Galvin APRN-CRNA   100 mg at 07/02/25 0903    [DISCONTINUED] midazolam (Versed) injection   intravenous PRN Laurie Galvin APRN-CRNA   2 mg at 07/02/25 0857    [DISCONTINUED] ondansetron (Zofran) injection 4 mg  4 mg intravenous Once PRN Tyler Mares MD        [DISCONTINUED] ondansetron (Zofran) injection   intravenous PRN Laurie Galvin APRN-CRNA   4 mg at 07/02/25 0919    [DISCONTINUED] oxygen (O2) therapy   inhalation Continuous PRN - O2/gases Tyler Mares MD        [DISCONTINUED] propofol (Diprivan) injection   intravenous PRN Laurie Galvin APRN-CRNA   200 mg at 07/02/25 0903

## (undated) DEVICE — PREP TRAY, SKIN, DRY, W/GLOVES

## (undated) DEVICE — ADHESIVE, SKIN, DERMABOND ADVANCED, 15CM, PEN-STYLE

## (undated) DEVICE — Device

## (undated) DEVICE — IRRIGATION SET, CYSTOSCOPY, F/CONSTANT/INTERMITTENT, 8 GTT/CC, 77 IN

## (undated) DEVICE — XPS LASER

## (undated) DEVICE — BAG, DRAINAGE, ANTI-REFLUX CHAMBER, 2000ML

## (undated) DEVICE — SUTURE, MONOCRYL, 4-0, 18 IN, PS2, UNDYED

## (undated) DEVICE — SOLUTION, IRRIGATION, SODIUM CHLORIDE 0.9%, 1000 ML, POUR BOTTLE

## (undated) DEVICE — CAUTERY, PENCIL, PUSH BUTTON, SMOKE EVAC, 70MM

## (undated) DEVICE — DRAPE PACK, MINOR, CUSTOM, GEAUGA

## (undated) DEVICE — ELECTRODE, ELECTROSURGICAL, BLADE, INSULATED, ENT/IMA, STERILE

## (undated) DEVICE — XPS MOXY FIBER

## (undated) DEVICE — CATHETER, URETHRAL, FOLEY, 2 WAY, BARDEX LUBRICATH, MEDIUM LENGTH, 20 FR, 30 CC

## (undated) DEVICE — SYSTEM, EASY CATCHER, DISP, NON-STERILE

## (undated) DEVICE — GLOVE, PROTEXIS PI CLASSIC, SZ-7.5, PF, LF